# Patient Record
Sex: MALE | Race: WHITE | NOT HISPANIC OR LATINO | Employment: OTHER | URBAN - METROPOLITAN AREA
[De-identification: names, ages, dates, MRNs, and addresses within clinical notes are randomized per-mention and may not be internally consistent; named-entity substitution may affect disease eponyms.]

---

## 2018-12-13 ENCOUNTER — APPOINTMENT (OUTPATIENT)
Dept: RADIOLOGY | Facility: MEDICAL CENTER | Age: 78
End: 2018-12-13
Attending: EMERGENCY MEDICINE
Payer: MEDICARE

## 2018-12-13 ENCOUNTER — HOSPITAL ENCOUNTER (EMERGENCY)
Facility: MEDICAL CENTER | Age: 78
End: 2018-12-13
Attending: EMERGENCY MEDICINE
Payer: MEDICARE

## 2018-12-13 VITALS
TEMPERATURE: 97.6 F | HEIGHT: 67 IN | SYSTOLIC BLOOD PRESSURE: 139 MMHG | DIASTOLIC BLOOD PRESSURE: 72 MMHG | WEIGHT: 150 LBS | RESPIRATION RATE: 16 BRPM | HEART RATE: 58 BPM | BODY MASS INDEX: 23.54 KG/M2 | OXYGEN SATURATION: 98 %

## 2018-12-13 DIAGNOSIS — R53.1 WEAKNESS: ICD-10-CM

## 2018-12-13 DIAGNOSIS — N39.0 URINARY TRACT INFECTION WITHOUT HEMATURIA, SITE UNSPECIFIED: ICD-10-CM

## 2018-12-13 LAB
ALBUMIN SERPL BCP-MCNC: 4 G/DL (ref 3.2–4.9)
ALBUMIN/GLOB SERPL: 1.5 G/DL
ALP SERPL-CCNC: 80 U/L (ref 30–99)
ALT SERPL-CCNC: 17 U/L (ref 2–50)
ANION GAP SERPL CALC-SCNC: 5 MMOL/L (ref 0–11.9)
APPEARANCE UR: ABNORMAL
AST SERPL-CCNC: 15 U/L (ref 12–45)
BACTERIA #/AREA URNS HPF: ABNORMAL /HPF
BASOPHILS # BLD AUTO: 0.3 % (ref 0–1.8)
BASOPHILS # BLD: 0.03 K/UL (ref 0–0.12)
BILIRUB SERPL-MCNC: 0.6 MG/DL (ref 0.1–1.5)
BILIRUB UR QL STRIP.AUTO: NEGATIVE
BUN SERPL-MCNC: 23 MG/DL (ref 8–22)
CALCIUM SERPL-MCNC: 9.1 MG/DL (ref 8.5–10.5)
CHLORIDE SERPL-SCNC: 105 MMOL/L (ref 96–112)
CO2 SERPL-SCNC: 27 MMOL/L (ref 20–33)
COLOR UR: YELLOW
CREAT SERPL-MCNC: 1.01 MG/DL (ref 0.5–1.4)
EKG IMPRESSION: NORMAL
EOSINOPHIL # BLD AUTO: 0.04 K/UL (ref 0–0.51)
EOSINOPHIL NFR BLD: 0.4 % (ref 0–6.9)
EPI CELLS #/AREA URNS HPF: NEGATIVE /HPF
ERYTHROCYTE [DISTWIDTH] IN BLOOD BY AUTOMATED COUNT: 45.7 FL (ref 35.9–50)
GLOBULIN SER CALC-MCNC: 2.7 G/DL (ref 1.9–3.5)
GLUCOSE SERPL-MCNC: 95 MG/DL (ref 65–99)
GLUCOSE UR STRIP.AUTO-MCNC: NEGATIVE MG/DL
HCT VFR BLD AUTO: 52.8 % (ref 42–52)
HGB BLD-MCNC: 18.1 G/DL (ref 14–18)
HYALINE CASTS #/AREA URNS LPF: ABNORMAL /LPF
IMM GRANULOCYTES # BLD AUTO: 0.05 K/UL (ref 0–0.11)
IMM GRANULOCYTES NFR BLD AUTO: 0.5 % (ref 0–0.9)
KETONES UR STRIP.AUTO-MCNC: NEGATIVE MG/DL
LEUKOCYTE ESTERASE UR QL STRIP.AUTO: ABNORMAL
LYMPHOCYTES # BLD AUTO: 0.58 K/UL (ref 1–4.8)
LYMPHOCYTES NFR BLD: 5.3 % (ref 22–41)
MCH RBC QN AUTO: 32.7 PG (ref 27–33)
MCHC RBC AUTO-ENTMCNC: 34.3 G/DL (ref 33.7–35.3)
MCV RBC AUTO: 95.5 FL (ref 81.4–97.8)
MICRO URNS: ABNORMAL
MONOCYTES # BLD AUTO: 0.53 K/UL (ref 0–0.85)
MONOCYTES NFR BLD AUTO: 4.8 % (ref 0–13.4)
NEUTROPHILS # BLD AUTO: 9.77 K/UL (ref 1.82–7.42)
NEUTROPHILS NFR BLD: 88.7 % (ref 44–72)
NITRITE UR QL STRIP.AUTO: NEGATIVE
NRBC # BLD AUTO: 0 K/UL
NRBC BLD-RTO: 0 /100 WBC
PH UR STRIP.AUTO: 5 [PH]
PLATELET # BLD AUTO: 172 K/UL (ref 164–446)
PMV BLD AUTO: 10.3 FL (ref 9–12.9)
POTASSIUM SERPL-SCNC: 4.1 MMOL/L (ref 3.6–5.5)
PROT SERPL-MCNC: 6.7 G/DL (ref 6–8.2)
PROT UR QL STRIP: NEGATIVE MG/DL
RBC # BLD AUTO: 5.53 M/UL (ref 4.7–6.1)
RBC UR QL AUTO: ABNORMAL
SODIUM SERPL-SCNC: 137 MMOL/L (ref 135–145)
SP GR UR STRIP.AUTO: 1.04
TROPONIN I SERPL-MCNC: <0.01 NG/ML (ref 0–0.04)
UROBILINOGEN UR STRIP.AUTO-MCNC: 0.2 MG/DL
WBC # BLD AUTO: 11 K/UL (ref 4.8–10.8)
WBC #/AREA URNS HPF: ABNORMAL /HPF

## 2018-12-13 PROCEDURE — 81001 URINALYSIS AUTO W/SCOPE: CPT

## 2018-12-13 PROCEDURE — 70498 CT ANGIOGRAPHY NECK: CPT

## 2018-12-13 PROCEDURE — 85025 COMPLETE CBC W/AUTO DIFF WBC: CPT

## 2018-12-13 PROCEDURE — 80053 COMPREHEN METABOLIC PANEL: CPT

## 2018-12-13 PROCEDURE — 700111 HCHG RX REV CODE 636 W/ 250 OVERRIDE (IP): Performed by: EMERGENCY MEDICINE

## 2018-12-13 PROCEDURE — 700105 HCHG RX REV CODE 258: Performed by: EMERGENCY MEDICINE

## 2018-12-13 PROCEDURE — 93005 ELECTROCARDIOGRAM TRACING: CPT | Performed by: EMERGENCY MEDICINE

## 2018-12-13 PROCEDURE — 700117 HCHG RX CONTRAST REV CODE 255: Performed by: EMERGENCY MEDICINE

## 2018-12-13 PROCEDURE — 93005 ELECTROCARDIOGRAM TRACING: CPT

## 2018-12-13 PROCEDURE — 84484 ASSAY OF TROPONIN QUANT: CPT

## 2018-12-13 PROCEDURE — 70496 CT ANGIOGRAPHY HEAD: CPT

## 2018-12-13 PROCEDURE — 96365 THER/PROPH/DIAG IV INF INIT: CPT

## 2018-12-13 PROCEDURE — 99284 EMERGENCY DEPT VISIT MOD MDM: CPT

## 2018-12-13 RX ORDER — CEFDINIR 300 MG/1
300 CAPSULE ORAL 2 TIMES DAILY
Qty: 20 CAP | Refills: 0 | Status: SHIPPED | OUTPATIENT
Start: 2018-12-13 | End: 2019-04-06

## 2018-12-13 RX ADMIN — IOHEXOL 100 ML: 350 INJECTION, SOLUTION INTRAVENOUS at 15:23

## 2018-12-13 RX ADMIN — CEFTRIAXONE SODIUM 1 G: 1 INJECTION, POWDER, FOR SOLUTION INTRAMUSCULAR; INTRAVENOUS at 17:12

## 2018-12-13 ASSESSMENT — PAIN SCALES - GENERAL
PAINLEVEL_OUTOF10: 1
PAINLEVEL_OUTOF10: 0

## 2018-12-13 ASSESSMENT — LIFESTYLE VARIABLES: DO YOU DRINK ALCOHOL: NO

## 2018-12-13 NOTE — ED NOTES
General feeling of slight weakness and occasional cough noted. Nausea noted, no vomiting, or diarrhea noted.

## 2018-12-13 NOTE — ED TRIAGE NOTES
"Patient has dementia, per his daughter he is weak this am, no noted increased confusion.  He has been eating and drinking.  NO unilateral weakness noted, no major history otherwise.  Patient complaints of dizziness.  NO noted CP.  No SOB.  Daughter became upset when asked if the patient has been drinking water.  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    "

## 2018-12-13 NOTE — ED PROVIDER NOTES
"ED Provider Note    Scribed for Radhika Mei M.D. by Singh Moore. 2018, 12:52 PM.    Primary care provider: PCP, none noted.  Means of arrival: Walk in  History obtained from: Patient  History limited by: None    CHIEF COMPLAINT  Chief Complaint   Patient presents with   • Weakness   • Nausea   • Dizziness       HPI  Andre Troncoso is a 78 y.o. male with a history of glaucoma and cataracts who presents to the Emergency Department constant left sided weakness onset prior to arrival. The patient's daughter reports that he was able to move all of his extremities. He woke up dizzy, nauseated, and developed vision loss, but his symptoms have since completely resolved, only lasting for approximately 45 minutes. The patient's daughter reports increased PO intake. He denies any loss of appetite, emesis, diarrhea, constipation, dysuria, and malodorous urine. No exacerbating or alleviating factors noted.    REVIEW OF SYSTEMS  Pertinent positives include weakness, dizziness, nausea, and vision loss. Pertinent negatives include no loss of appetite, emesis, diarrhea, constipation, dysuria, and malodorous urine.  All other systems reviewed and negative.    PAST MEDICAL HISTORY   has a past medical history of Psychiatric disorder.    SURGICAL HISTORY  patient denies any surgical history    SOCIAL HISTORY  Social History   Substance Use Topics   • Smoking status: Current Every Day Smoker     Packs/day: 1.50     Years: 60.00     Types: Cigarettes   • Smokeless tobacco: Never Used   • Alcohol use No      History   Drug Use No       FAMILY HISTORY  Father is  secondary to stroke.    CURRENT MEDICATIONS  Reviewed.  See Encounter Summary.     ALLERGIES  No Known Allergies    PHYSICAL EXAM  VITAL SIGNS: /77   Pulse (!) 55   Temp 36.4 °C (97.6 °F) (Temporal)   Resp 17   Ht 1.702 m (5' 7\")   Wt 68 kg (150 lb)   SpO2 98%   BMI 23.49 kg/m²   Constitutional: Alert in no apparent distress.  HENT: No " signs of trauma, Bilateral external ears normal, Nose normal.   Eyes: Pupils are equal and reactive, Conjunctiva normal, Non-icteric. Poor vision  Neck: Normal range of motion, No tenderness, Supple, No stridor.   Cardiovascular: Regular rate and rhythm, no murmurs.   Thorax & Lungs: Normal breath sounds, No respiratory distress, No wheezing, No chest tenderness.   Abdomen: Bowel sounds normal, Soft, No tenderness, No masses, No peritoneal signs.  Skin: Warm, Dry, No erythema, No rash.   Musculoskeletal:  No major deformities noted.  Neurologic: Alert, Normal motor function. Poor vision and his finger to nose is not reliable he did not have any dysmetria just seemed to not know where my finger or his nose were, but his daughter states that this is baseline.    DIAGNOSTIC STUDIES / PROCEDURES     LABS  Labs Reviewed   CBC WITH DIFFERENTIAL - Abnormal; Notable for the following:        Result Value    WBC 11.0 (*)     Hemoglobin 18.1 (*)     Hematocrit 52.8 (*)     Neutrophils-Polys 88.70 (*)     Lymphocytes 5.30 (*)     Neutrophils (Absolute) 9.77 (*)     Lymphs (Absolute) 0.58 (*)     All other components within normal limits   COMP METABOLIC PANEL - Abnormal; Notable for the following:     Bun 23 (*)     All other components within normal limits   URINALYSIS,CULTURE IF INDICATED - Abnormal; Notable for the following:     Character Cloudy (*)     Leukocyte Esterase Moderate (*)     Occult Blood Small (*)     All other components within normal limits   URINE MICROSCOPIC (W/UA) - Abnormal; Notable for the following:     WBC Packed (*)     Bacteria Moderate (*)     All other components within normal limits   TROPONIN   ESTIMATED GFR   URINE CULTURE(NEW)     All labs were reviewed by me.    EKG  12 Lead EKG interpreted by me to show:  Sinus bradycardia  Rate 57  Axis: Normal  Intervals: Normal  No acute ST wave changes.  T wave inversion in aVL, but no other ischemia.  No old EKG for comparison.     RADIOLOGY  CT-CTA  "NECK WITH & W/O-POST PROCESSING   Final Result      1.  Atherosclerotic disease as described.      2.  Occlusion of the right vertebral artery with reconstitution distally. Vessel caliber remains extremely small.      CT-CTA HEAD WITH & W/O-POST PROCESS   Final Result      1.  No evidence of intracranial vascular occlusion or aneurysm.      2.  Very diminutive right vertebral artery. The left vertebral artery is patent.        The radiologist's interpretation of all radiological studies and images have been reviewed by me.    COURSE & MEDICAL DECISION MAKING  Pertinent Labs & Imaging studies reviewed. (See chart for details)    Differential diagnoses include but are not limited to: TIA, electrolyte abnormality, and urine infection    12:52 PM - Patient seen and examined at bedside. Ordered CT-CTA neck, CT-CTA neck, UA, CBC, CMP, Troponin, and EKG to evaluate his symptoms. I informed the patient's daughter and the patient that I am concerned for TIA and electrolyte abnormality. A CT scan can be performed to evaluate and he would like to undergo the imaging.    1:36 PM Ordered estimated GFR for evaluation.    4:55 PM I reevaluated the patient and found him resting in bed. I informed the patient and his daughter that he has a UTI. He will need to follow up with his PCP for evaluation. He will receive intravenous antibiotic here and then will be discharged with a prescription for antibiotics. Should he develop any new or worsening symptoms, he is to return for evaluation. The patient understands and agrees to discharge home.  4:58 PM Patient treated with Rocephin 1 g  mL IVPB.    /72   Pulse (!) 58   Temp 36.4 °C (97.6 °F) (Temporal)   Resp 16   Ht 1.702 m (5' 7\")   Wt 68 kg (150 lb)   SpO2 98%   BMI 23.49 kg/m²     Decision Making:  This is a 78 y.o. year old male who presents with generalized weakness.  There was report of him complaining his left side was weaker however this is not able to be " elicited on exam.  CT scans are negative for occlusion.  Labs do show evidence of urinary tract infection and he has a mildly elevated white blood cell count I do think this is likely the cause of generalized weakness.  He will be treated for this and will be discharged home he is instructed to follow-up with his primary care doctor as well.  Patient and his daughter are agreeable to this plan    The patient will return for new or worsening symptoms and is stable at the time of discharge.    Follow up with your primary care provider for reevaluation of your blood pressure.    DISPOSITION:  Patient will be discharged home in stable condition.    FOLLOW UP:  [facility], Emergency Dept  [address] [address] [phone]    Return for worsening weakness, headache, fever, nausea and vomiting or other concerns      OUTPATIENT MEDICATIONS:  New Prescriptions    CEFDINIR (OMNICEF) 300 MG CAP    Take 1 Cap by mouth 2 times a day.         FINAL IMPRESSION  1. Weakness    2. Urinary tract infection without hematuria, site unspecified                 This dictation has been created using voice recognition software and/or scribes. The accuracy of the dictation is limited by the abilities of the software and the expertise of the scribes. I expect there may be some errors of grammar and possibly content. I made every attempt to manually correct the errors within my dictation. However, errors related to voice recognition software and/or scribes may still exist and should be interpreted within the appropriate context.       Singh VANCE (Scribe), am scribing for, and in the presence of, Radhika Mei M.D..    Electronically signed by: Singh Moore (Scribe), 12/13/2018    Radhika VANCE M.D. personally performed the services described in this documentation, as scribed by Singh Moore in my presence, and it is both accurate and complete. C    The note accurately  reflects work and decisions made by me.  Radhika Mei  12/13/2018  6:54 PM

## 2018-12-14 NOTE — ED NOTES
Patient given discharge instructions and prescription. Discharge to home with daughter.  Patient ambulatory, no signs of distress noted.

## 2019-04-06 ENCOUNTER — APPOINTMENT (OUTPATIENT)
Dept: RADIOLOGY | Facility: MEDICAL CENTER | Age: 79
End: 2019-04-06
Attending: EMERGENCY MEDICINE
Payer: MEDICARE

## 2019-04-06 ENCOUNTER — HOSPITAL ENCOUNTER (OUTPATIENT)
Facility: MEDICAL CENTER | Age: 79
End: 2019-04-09
Attending: EMERGENCY MEDICINE | Admitting: HOSPITALIST
Payer: MEDICARE

## 2019-04-06 DIAGNOSIS — R55 SYNCOPE, UNSPECIFIED SYNCOPE TYPE: ICD-10-CM
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
## 2019-04-06 PROBLEM — R00.1 BRADYCARDIA: Status: ACTIVE | Noted: 2019-04-06

## 2019-04-06 LAB
ALBUMIN SERPL BCP-MCNC: 3.1 G/DL (ref 3.2–4.9)
ALBUMIN/GLOB SERPL: 1.2 G/DL
ALP SERPL-CCNC: 66 U/L (ref 30–99)
ALT SERPL-CCNC: 16 U/L (ref 2–50)
ANION GAP SERPL CALC-SCNC: 4 MMOL/L (ref 0–11.9)
AST SERPL-CCNC: 15 U/L (ref 12–45)
BASOPHILS # BLD AUTO: 0.8 % (ref 0–1.8)
BASOPHILS # BLD: 0.04 K/UL (ref 0–0.12)
BILIRUB SERPL-MCNC: 0.5 MG/DL (ref 0.1–1.5)
BUN SERPL-MCNC: 15 MG/DL (ref 8–22)
CALCIUM SERPL-MCNC: 8.1 MG/DL (ref 8.5–10.5)
CHLORIDE SERPL-SCNC: 109 MMOL/L (ref 96–112)
CO2 SERPL-SCNC: 27 MMOL/L (ref 20–33)
CREAT SERPL-MCNC: 1.18 MG/DL (ref 0.5–1.4)
EKG IMPRESSION: NORMAL
EOSINOPHIL # BLD AUTO: 0.11 K/UL (ref 0–0.51)
EOSINOPHIL NFR BLD: 2.1 % (ref 0–6.9)
ERYTHROCYTE [DISTWIDTH] IN BLOOD BY AUTOMATED COUNT: 47.2 FL (ref 35.9–50)
GLOBULIN SER CALC-MCNC: 2.6 G/DL (ref 1.9–3.5)
GLUCOSE SERPL-MCNC: 74 MG/DL (ref 65–99)
HCT VFR BLD AUTO: 49.9 % (ref 42–52)
HGB BLD-MCNC: 16.3 G/DL (ref 14–18)
IMM GRANULOCYTES # BLD AUTO: 0.03 K/UL (ref 0–0.11)
IMM GRANULOCYTES NFR BLD AUTO: 0.6 % (ref 0–0.9)
LYMPHOCYTES # BLD AUTO: 0.73 K/UL (ref 1–4.8)
LYMPHOCYTES NFR BLD: 14 % (ref 22–41)
MCH RBC QN AUTO: 32.5 PG (ref 27–33)
MCHC RBC AUTO-ENTMCNC: 32.7 G/DL (ref 33.7–35.3)
MCV RBC AUTO: 99.4 FL (ref 81.4–97.8)
MONOCYTES # BLD AUTO: 0.46 K/UL (ref 0–0.85)
MONOCYTES NFR BLD AUTO: 8.8 % (ref 0–13.4)
NEUTROPHILS # BLD AUTO: 3.84 K/UL (ref 1.82–7.42)
NEUTROPHILS NFR BLD: 73.7 % (ref 44–72)
NRBC # BLD AUTO: 0 K/UL
NRBC BLD-RTO: 0 /100 WBC
PLATELET # BLD AUTO: 155 K/UL (ref 164–446)
PMV BLD AUTO: 9.6 FL (ref 9–12.9)
POTASSIUM SERPL-SCNC: 4.1 MMOL/L (ref 3.6–5.5)
PROT SERPL-MCNC: 5.7 G/DL (ref 6–8.2)
RBC # BLD AUTO: 5.02 M/UL (ref 4.7–6.1)
SODIUM SERPL-SCNC: 140 MMOL/L (ref 135–145)
TROPONIN I SERPL-MCNC: <0.01 NG/ML (ref 0–0.04)
WBC # BLD AUTO: 5.2 K/UL (ref 4.8–10.8)

## 2019-04-06 PROCEDURE — G0378 HOSPITAL OBSERVATION PER HR: HCPCS

## 2019-04-06 PROCEDURE — 85025 COMPLETE CBC W/AUTO DIFF WBC: CPT

## 2019-04-06 PROCEDURE — 93005 ELECTROCARDIOGRAM TRACING: CPT | Performed by: EMERGENCY MEDICINE

## 2019-04-06 PROCEDURE — 36415 COLL VENOUS BLD VENIPUNCTURE: CPT

## 2019-04-06 PROCEDURE — 71045 X-RAY EXAM CHEST 1 VIEW: CPT

## 2019-04-06 PROCEDURE — 99219 PR INITIAL OBSERVATION CARE,LEVL II: CPT | Performed by: HOSPITALIST

## 2019-04-06 PROCEDURE — 93005 ELECTROCARDIOGRAM TRACING: CPT

## 2019-04-06 PROCEDURE — 99285 EMERGENCY DEPT VISIT HI MDM: CPT

## 2019-04-06 PROCEDURE — 80053 COMPREHEN METABOLIC PANEL: CPT

## 2019-04-06 PROCEDURE — 84484 ASSAY OF TROPONIN QUANT: CPT

## 2019-04-06 PROCEDURE — 70450 CT HEAD/BRAIN W/O DYE: CPT

## 2019-04-06 PROCEDURE — 700111 HCHG RX REV CODE 636 W/ 250 OVERRIDE (IP): Performed by: HOSPITALIST

## 2019-04-06 RX ORDER — ACETAMINOPHEN 500 MG
500 TABLET ORAL EVERY 4 HOURS PRN
COMMUNITY

## 2019-04-06 RX ORDER — BISACODYL 10 MG
10 SUPPOSITORY, RECTAL RECTAL
Status: DISCONTINUED | OUTPATIENT
Start: 2019-04-06 | End: 2019-04-09 | Stop reason: HOSPADM

## 2019-04-06 RX ORDER — ACETAMINOPHEN 325 MG/1
650 TABLET ORAL EVERY 6 HOURS PRN
Status: DISCONTINUED | OUTPATIENT
Start: 2019-04-06 | End: 2019-04-09 | Stop reason: HOSPADM

## 2019-04-06 RX ORDER — POLYETHYLENE GLYCOL 3350 17 G/17G
1 POWDER, FOR SOLUTION ORAL
Status: DISCONTINUED | OUTPATIENT
Start: 2019-04-06 | End: 2019-04-09 | Stop reason: HOSPADM

## 2019-04-06 RX ORDER — AMOXICILLIN 250 MG
2 CAPSULE ORAL 2 TIMES DAILY
Status: DISCONTINUED | OUTPATIENT
Start: 2019-04-06 | End: 2019-04-09 | Stop reason: HOSPADM

## 2019-04-06 RX ORDER — PERMETHRIN 50 MG/G
30 CREAM TOPICAL
Status: ON HOLD | COMMUNITY
End: 2019-04-09

## 2019-04-06 RX ADMIN — ENOXAPARIN SODIUM 40 MG: 100 INJECTION SUBCUTANEOUS at 19:00

## 2019-04-06 ASSESSMENT — COGNITIVE AND FUNCTIONAL STATUS - GENERAL
SUGGESTED CMS G CODE MODIFIER DAILY ACTIVITY: CK
DRESSING REGULAR UPPER BODY CLOTHING: A LITTLE
PERSONAL GROOMING: A LITTLE
WALKING IN HOSPITAL ROOM: A LITTLE
DRESSING REGULAR LOWER BODY CLOTHING: A LITTLE
TOILETING: A LITTLE
SUGGESTED CMS G CODE MODIFIER MOBILITY: CJ
TURNING FROM BACK TO SIDE WHILE IN FLAT BAD: A LITTLE
DAILY ACTIVITIY SCORE: 19
HELP NEEDED FOR BATHING: A LITTLE
CLIMB 3 TO 5 STEPS WITH RAILING: A LITTLE
MOBILITY SCORE: 21

## 2019-04-06 ASSESSMENT — COPD QUESTIONNAIRES
IN THE PAST 12 MONTHS DO YOU DO LESS THAN YOU USED TO BECAUSE OF YOUR BREATHING PROBLEMS: DISAGREE/UNSURE
DURING THE PAST 4 WEEKS HOW MUCH DID YOU FEEL SHORT OF BREATH: NONE/LITTLE OF THE TIME
HAVE YOU SMOKED AT LEAST 100 CIGARETTES IN YOUR ENTIRE LIFE: NO/DON'T KNOW
COPD SCREENING SCORE: 2
DO YOU EVER COUGH UP ANY MUCUS OR PHLEGM?: NO/ONLY WITH OCCASIONAL COLDS OR INFECTIONS

## 2019-04-06 ASSESSMENT — ENCOUNTER SYMPTOMS
HEMOPTYSIS: 0
NAUSEA: 0
PALPITATIONS: 0
SPUTUM PRODUCTION: 0
COUGH: 0
VOMITING: 0
DIZZINESS: 0
ORTHOPNEA: 0
CHILLS: 0

## 2019-04-06 ASSESSMENT — LIFESTYLE VARIABLES: EVER_SMOKED: YES

## 2019-04-06 NOTE — ED NOTES
Med rec complete per pt at bedside   Pt denies taking medications   NKDA  No oral ABX within last 30 days

## 2019-04-06 NOTE — ED PROVIDER NOTES
ED Provider Note    CHIEF COMPLAINT  Chief Complaint   Patient presents with   • T-5000 GLF   • Syncope       HPI  Andre Troncoso is a 78 y.o. male who presents after being found on the ground next to his chair at the [facility] his place of residence.  Staff there was uncertain what occurred, and said that he was found on the ground next to his chair.  The patient had no complaints.  He has a history of dementia, and per staff has been there about 1 week.  Patient was transferred by EMS to the ER for further evaluation.  On arrival, he appears oriented to person.  He cannot tell me or remember what happened.  He denies any pain or injuries.  He denies any headache, neck pain, chest pain, back pain, or abdominal pain.  He can move his arms legs without difficulty, denies any numbness or tingling or weakness.  He has had no recent nausea, vomiting, or diarrhea.  He says he is hungry.    REVIEW OF SYSTEMS  See HPI for further details. All other systems are negative.     PAST MEDICAL HISTORY  Past Medical History:   Diagnosis Date   • Psychiatric disorder     dementai        FAMILY HISTORY  No family history on file.    SOCIAL HISTORY  Social History     Social History   • Marital status: Single     Spouse name: N/A   • Number of children: N/A   • Years of education: N/A     Social History Main Topics   • Smoking status: Current Every Day Smoker     Packs/day: 1.50     Years: 60.00     Types: Cigarettes   • Smokeless tobacco: Never Used   • Alcohol use No   • Drug use: No   • Sexual activity: Not on file     Other Topics Concern   • Not on file     Social History Narrative   • No narrative on file       SURGICAL HISTORY  History reviewed. No pertinent surgical history.    CURRENT MEDICATIONS  Home Medications     Reviewed by Adore Umanzor R.N. (Registered Nurse) on 04/06/19 at 0937  Med List Status: Partial   Medication Last Dose Status   cefdinir (OMNICEF) 300 MG Cap  Active                ALLERGIES  No  Known Allergies    PHYSICAL EXAM  VITAL SIGNS: /68   Pulse (!) 50   Temp 36.6 °C (97.9 °F) (Temporal)   Resp 17   Wt 68 kg (149 lb 14.6 oz)   SpO2 97%   BMI 23.48 kg/m²   Constitutional: Awake, alert, in no acute distress, Non-toxic appearance.   HENT: Atraumatic.  No cephalhematomas or any tenderness.   Bilateral external ears normal, mucous membranes moist, throat nonerythematous without exudates, nose is normal.  No facial tenderness.  Eyes: PERRL, EOMI, conjunctiva moist, noninjected.  Neck: Nontender, Normal range of motion, No nuchal rigidity, No stridor.   Lymphatic: No lymphadenopathy noted.   Cardiovascular: Regular rhythm, bradycardic, rate in the 40s-50s, no murmurs, rubs, gallops.  Thorax & Lungs:  Good breath sounds bilaterally, no wheezes, rales, or retractions.  No chest tenderness.  Abdomen: Bowel sounds normal, Soft, nontender, nondistended, no rebound, guarding, masses.  Back: No CVA or spinal tenderness.  Extremities: Intact distal pulses, No edema, No tenderness.   Skin: Warm, Dry, No rashes.   Musculoskeletal: No joint swelling or tenderness.  Neurologic: Alert & oriented x 1, cranial nerves II through XII appear intact, sensory and motor function normal. No focal deficits.   Psychiatric: Affect normal, mood normal, there is very to have short-term memory for since.    EKG  Twelve-lead EKG shows a sinus bradycardia, rate of 43, normal intervals, normal axis, no acute ST wave elevations or ST depressions, no pathologic Q waves, no evidence of an acute injury or ischemic pattern on my reading, in comparison to previous EKG from December, 2018, the patient was in a sinus bradycardic rhythm previously with a rate of 57.    RADIOLOGY/PROCEDURES  CT-HEAD W/O   Final Result      1.  No evidence of acute intracranial process.      2.  Cerebral atrophy as well as periventricular chronic small vessel ischemic change.      DX-CHEST-PORTABLE (1 VIEW)   Final Result      No evidence of acute  cardiopulmonary process.          COURSE & MEDICAL DECISION MAKING  Pertinent Labs & Imaging studies reviewed. (See chart for details)  The patient presents after a possible syncopal episode.  He is awake, and alert.  He denies any pain or injuries.  EKG shows sinus bradycardia, rate of 43.  Chest x-ray shows no acute cardiac or pulmonary abnormalities.  CT scan of head without contrast shows no acute intracranial process.  CBC shows white count 5200, hemoglobin 16.3, 74% polys, chemistry shows a calcium of 8.1, otherwise unremarkable.  Troponin less than 0.01.  On reexamination, the patient remained without complaints.  He did eat a lunch meal.  However given his bradycardic rhythm, patient will be admitted for observation.  I discussed case with Dr. Bustamante.    FINAL IMPRESSION  1.  Syncope   2.  Bradycardia  3.         Electronically signed by: Killian Ambrocio, 4/6/2019 11:59 AM

## 2019-04-06 NOTE — CARE PLAN
Problem: Safety  Goal: Will remain free from injury  Outcome: PROGRESSING AS EXPECTED  Patient was admitted after being found on the ground at his assisted living facility. Patient has a history of dementia and has been trying to get out of bed to leave. Patient has been placed on a vest restraint. Patient is alert and oriented to self only. Poor historian. Call light within reach. Bed alarm in place. Will continue to monitor.   Goal: Will remain free from falls  Outcome: PROGRESSING AS EXPECTED  Patient is a fall risk. Will round hourly and offer use of toilet to assist patient. Call light within reach. Continue to reorient patient as appropriate. Bed in lowest position, bed alarm engaged, vest restraint in place.

## 2019-04-06 NOTE — ED NOTES
Pt back from CT. Placed in B18, in view of sitter as pt is confused and has had multiple attempts to get OOB. Pt is unsafe to ambulate.

## 2019-04-06 NOTE — ED TRIAGE NOTES
Chief Complaint   Patient presents with   • T-5000 GLF   • Syncope     BIB EMS from [facility]. Per EMS pt had possible fall from chair. Unwitnessed. Pt denies pain/injury. Pt at baseline oriented to self. Pt placed on monitor. Chart up for ERP.

## 2019-04-07 ENCOUNTER — APPOINTMENT (OUTPATIENT)
Dept: CARDIOLOGY | Facility: MEDICAL CENTER | Age: 79
End: 2019-04-07
Attending: FAMILY MEDICINE
Payer: MEDICARE

## 2019-04-07 PROBLEM — H53.8 VISION BLURRED: Status: ACTIVE | Noted: 2019-04-07

## 2019-04-07 PROBLEM — E53.8 VITAMIN B12 DEFICIENCY: Status: ACTIVE | Noted: 2019-04-07

## 2019-04-07 PROBLEM — E55.9 VITAMIN D DEFICIENCY: Status: ACTIVE | Noted: 2019-04-07

## 2019-04-07 PROBLEM — E83.42 HYPOMAGNESEMIA: Status: ACTIVE | Noted: 2019-04-07

## 2019-04-07 PROBLEM — F02.80 DEMENTIA ASSOCIATED WITH OTHER UNDERLYING DISEASE WITHOUT BEHAVIORAL DISTURBANCE (HCC): Status: ACTIVE | Noted: 2019-04-07

## 2019-04-07 PROBLEM — H54.3 BLIND IN BOTH EYES: Status: ACTIVE | Noted: 2019-04-07

## 2019-04-07 LAB
25(OH)D3 SERPL-MCNC: 21 NG/ML (ref 30–100)
ANION GAP SERPL CALC-SCNC: 5 MMOL/L (ref 0–11.9)
BASOPHILS # BLD AUTO: 0.6 % (ref 0–1.8)
BASOPHILS # BLD: 0.04 K/UL (ref 0–0.12)
BUN SERPL-MCNC: 20 MG/DL (ref 8–22)
CALCIUM SERPL-MCNC: 8.2 MG/DL (ref 8.5–10.5)
CHLORIDE SERPL-SCNC: 112 MMOL/L (ref 96–112)
CO2 SERPL-SCNC: 24 MMOL/L (ref 20–33)
CREAT SERPL-MCNC: 0.97 MG/DL (ref 0.5–1.4)
EOSINOPHIL # BLD AUTO: 0.13 K/UL (ref 0–0.51)
EOSINOPHIL NFR BLD: 2.1 % (ref 0–6.9)
ERYTHROCYTE [DISTWIDTH] IN BLOOD BY AUTOMATED COUNT: 46.3 FL (ref 35.9–50)
GLUCOSE SERPL-MCNC: 97 MG/DL (ref 65–99)
HCT VFR BLD AUTO: 46.3 % (ref 42–52)
HGB BLD-MCNC: 15.8 G/DL (ref 14–18)
IMM GRANULOCYTES # BLD AUTO: 0.02 K/UL (ref 0–0.11)
IMM GRANULOCYTES NFR BLD AUTO: 0.3 % (ref 0–0.9)
LV EJECT FRACT  99904: 65
LV EJECT FRACT MOD 2C 99903: 65.33
LV EJECT FRACT MOD 4C 99902: 75.35
LV EJECT FRACT MOD BP 99901: 70.78
LYMPHOCYTES # BLD AUTO: 1.11 K/UL (ref 1–4.8)
LYMPHOCYTES NFR BLD: 17.9 % (ref 22–41)
MAGNESIUM SERPL-MCNC: 1.9 MG/DL (ref 1.5–2.5)
MCH RBC QN AUTO: 33.1 PG (ref 27–33)
MCHC RBC AUTO-ENTMCNC: 34.1 G/DL (ref 33.7–35.3)
MCV RBC AUTO: 97.1 FL (ref 81.4–97.8)
MONOCYTES # BLD AUTO: 0.61 K/UL (ref 0–0.85)
MONOCYTES NFR BLD AUTO: 9.9 % (ref 0–13.4)
NEUTROPHILS # BLD AUTO: 4.28 K/UL (ref 1.82–7.42)
NEUTROPHILS NFR BLD: 69.2 % (ref 44–72)
NRBC # BLD AUTO: 0 K/UL
NRBC BLD-RTO: 0 /100 WBC
PHOSPHATE SERPL-MCNC: 2.8 MG/DL (ref 2.5–4.5)
PLATELET # BLD AUTO: 149 K/UL (ref 164–446)
PMV BLD AUTO: 9.7 FL (ref 9–12.9)
POTASSIUM SERPL-SCNC: 4.1 MMOL/L (ref 3.6–5.5)
RBC # BLD AUTO: 4.77 M/UL (ref 4.7–6.1)
SODIUM SERPL-SCNC: 141 MMOL/L (ref 135–145)
TSH SERPL DL<=0.005 MIU/L-ACNC: 1.8 UIU/ML (ref 0.38–5.33)
VIT B12 SERPL-MCNC: 256 PG/ML (ref 211–911)
WBC # BLD AUTO: 6.2 K/UL (ref 4.8–10.8)

## 2019-04-07 PROCEDURE — 84443 ASSAY THYROID STIM HORMONE: CPT

## 2019-04-07 PROCEDURE — 83735 ASSAY OF MAGNESIUM: CPT

## 2019-04-07 PROCEDURE — A9270 NON-COVERED ITEM OR SERVICE: HCPCS | Performed by: FAMILY MEDICINE

## 2019-04-07 PROCEDURE — 84100 ASSAY OF PHOSPHORUS: CPT

## 2019-04-07 PROCEDURE — 93306 TTE W/DOPPLER COMPLETE: CPT | Mod: 26 | Performed by: INTERNAL MEDICINE

## 2019-04-07 PROCEDURE — 51798 US URINE CAPACITY MEASURE: CPT

## 2019-04-07 PROCEDURE — 99226 PR SUBSEQUENT OBSERVATION CARE,LEVEL III: CPT | Performed by: FAMILY MEDICINE

## 2019-04-07 PROCEDURE — G0378 HOSPITAL OBSERVATION PER HR: HCPCS

## 2019-04-07 PROCEDURE — 700111 HCHG RX REV CODE 636 W/ 250 OVERRIDE (IP): Performed by: HOSPITALIST

## 2019-04-07 PROCEDURE — 700102 HCHG RX REV CODE 250 W/ 637 OVERRIDE(OP): Performed by: HOSPITALIST

## 2019-04-07 PROCEDURE — 36415 COLL VENOUS BLD VENIPUNCTURE: CPT

## 2019-04-07 PROCEDURE — 82607 VITAMIN B-12: CPT

## 2019-04-07 PROCEDURE — 700111 HCHG RX REV CODE 636 W/ 250 OVERRIDE (IP): Performed by: FAMILY MEDICINE

## 2019-04-07 PROCEDURE — A9270 NON-COVERED ITEM OR SERVICE: HCPCS | Performed by: HOSPITALIST

## 2019-04-07 PROCEDURE — 80048 BASIC METABOLIC PNL TOTAL CA: CPT

## 2019-04-07 PROCEDURE — 93306 TTE W/DOPPLER COMPLETE: CPT

## 2019-04-07 PROCEDURE — 85025 COMPLETE CBC W/AUTO DIFF WBC: CPT

## 2019-04-07 PROCEDURE — 700102 HCHG RX REV CODE 250 W/ 637 OVERRIDE(OP): Performed by: FAMILY MEDICINE

## 2019-04-07 PROCEDURE — 82306 VITAMIN D 25 HYDROXY: CPT

## 2019-04-07 RX ORDER — TAMSULOSIN HYDROCHLORIDE 0.4 MG/1
0.4 CAPSULE ORAL
Status: DISCONTINUED | OUTPATIENT
Start: 2019-04-07 | End: 2019-04-09 | Stop reason: HOSPADM

## 2019-04-07 RX ORDER — CHOLECALCIFEROL (VITAMIN D3) 125 MCG
1000 CAPSULE ORAL DAILY
Status: DISCONTINUED | OUTPATIENT
Start: 2019-04-07 | End: 2019-04-09 | Stop reason: HOSPADM

## 2019-04-07 RX ORDER — CYANOCOBALAMIN 1000 UG/ML
1000 INJECTION, SOLUTION INTRAMUSCULAR; SUBCUTANEOUS ONCE
Status: COMPLETED | OUTPATIENT
Start: 2019-04-07 | End: 2019-04-07

## 2019-04-07 RX ORDER — MAGNESIUM SULFATE HEPTAHYDRATE 40 MG/ML
2 INJECTION, SOLUTION INTRAVENOUS ONCE
Status: COMPLETED | OUTPATIENT
Start: 2019-04-07 | End: 2019-04-07

## 2019-04-07 RX ADMIN — MAGNESIUM OXIDE TAB 400 MG (241.3 MG ELEMENTAL MG) 400 MG: 400 (241.3 MG) TAB at 17:17

## 2019-04-07 RX ADMIN — MAGNESIUM SULFATE IN WATER 2 G: 40 INJECTION, SOLUTION INTRAVENOUS at 11:20

## 2019-04-07 RX ADMIN — SENNOSIDES,DOCUSATE SODIUM 2 TABLET: 8.6; 5 TABLET, FILM COATED ORAL at 05:06

## 2019-04-07 RX ADMIN — CYANOCOBALAMIN 1000 MCG: 1000 INJECTION, SOLUTION INTRAMUSCULAR; SUBCUTANEOUS at 14:39

## 2019-04-07 RX ADMIN — ENOXAPARIN SODIUM 40 MG: 100 INJECTION SUBCUTANEOUS at 05:06

## 2019-04-07 RX ADMIN — MAGNESIUM OXIDE TAB 400 MG (241.3 MG ELEMENTAL MG) 400 MG: 400 (241.3 MG) TAB at 11:19

## 2019-04-07 RX ADMIN — VITAMIN D, TAB 1000IU (100/BT) 5000 UNITS: 25 TAB at 05:05

## 2019-04-07 RX ADMIN — SENNOSIDES,DOCUSATE SODIUM 2 TABLET: 8.6; 5 TABLET, FILM COATED ORAL at 17:17

## 2019-04-07 RX ADMIN — TAMSULOSIN HYDROCHLORIDE 0.4 MG: 0.4 CAPSULE ORAL at 17:17

## 2019-04-07 RX ADMIN — CYANOCOBALAMIN TAB 500 MCG 1000 MCG: 500 TAB at 14:40

## 2019-04-07 ASSESSMENT — ENCOUNTER SYMPTOMS
COUGH: 0
EYE DISCHARGE: 0
WEAKNESS: 0
BACK PAIN: 0
NERVOUS/ANXIOUS: 0
FEVER: 0
SORE THROAT: 0
PALPITATIONS: 0
WHEEZING: 0
NECK PAIN: 0
ABDOMINAL PAIN: 0
FOCAL WEAKNESS: 0
EYE PAIN: 0
HEADACHES: 0
DIARRHEA: 0
SPEECH CHANGE: 0
NAUSEA: 0
SENSORY CHANGE: 0
SHORTNESS OF BREATH: 0
BLURRED VISION: 1
EYE REDNESS: 0
HEARTBURN: 0
VOMITING: 0
CHILLS: 0
DIZZINESS: 0

## 2019-04-07 ASSESSMENT — PATIENT HEALTH QUESTIONNAIRE - PHQ9
2. FEELING DOWN, DEPRESSED, IRRITABLE, OR HOPELESS: NOT AT ALL
1. LITTLE INTEREST OR PLEASURE IN DOING THINGS: NOT AT ALL
SUM OF ALL RESPONSES TO PHQ9 QUESTIONS 1 AND 2: 0

## 2019-04-07 NOTE — PROGRESS NOTES
"Contacted [facility] regarding the visual impairment. Per facility employee, they are aware of his vision problem but they don't think he is blind and \"he probably sees because he can get around.\" They state he walks around the facility on his own. Updated them on MD assessment here at the hospital.   "

## 2019-04-07 NOTE — PROGRESS NOTES
Orthostatic BP complete:  Supine 111/63, HR 56  Sitting 124/69, HR 60  Standing 128/58, HR 63    Pt is dizzy and weak with standing.

## 2019-04-07 NOTE — PROGRESS NOTES
Received bedside report from PM nurse. Assumed patient care. Chart reviewed. Pt was resting in bed. A&O x 1, confused. Sustaining in SB 40-50's. Asymptomatic.   Lap belt in place as a restraint per active order.   Patient denies pain. POC updated with pt and on the patient communication board. Bed locked and in the lowest position. Call light within reach. Tele box on. Will continue to monitor.

## 2019-04-07 NOTE — PROGRESS NOTES
Bedside report received, pt care assumed. Pt is awaked, disoriented to time, place, and situation. Pt attempt to get oob without assistance/high fall risk- vest in place as per order for disruption in care, bed alarm x2 for safety, and pt room close to nursing station. Frequent reorientation provided, pt educated on call bell use- not to get oob on own and plan of care. Pt is calm, polite at this time. Declines any current needs, denies pain. Will continue to monitor.

## 2019-04-07 NOTE — PROGRESS NOTES
Rounds with Dr. Shahid. Pt is confused and seems to be unable to visualize objects. Assessment by MD. Pt is legally blind. Pt is unsure if he is and is not able to verbalize the difficulty seeing.

## 2019-04-07 NOTE — PROGRESS NOTES
Order changed from vest to lap belt for pt safety. Pt is confused and no evidence of learning. Pt return demonstrated how to discontinue lap belt appropriately, twice before application. Pt remains polite, confused.

## 2019-04-07 NOTE — PROGRESS NOTES
Withdrew 1400 mL urine via straight cath at 1620. 3 attempts made. 18 Peruvian coude cath successful.

## 2019-04-07 NOTE — PROGRESS NOTES
"[facility] Medicine Daily Progress Note    Date of Service  4/7/2019    Chief Complaint  78 y.o. male admitted 4/6/2019 with Fall.     Hospital Course  Admitted with fall, noted to have bradycardia in the 40s-50s.    Interval Problem Update  Fall - unknown details, poor historian  Bradycardia -40-50s, SBP 120s, denies dizziness or lightheadedness  Blurred vision -patient states is chronic, unable to do an accurate exam, he keeps saying \"I can see.\"  Low B12 and vitamin D level  Low magnesium    Consultants/Specialty  Consult Palliative care    Code Status  Full    Disposition  TBD    Review of Systems  Review of Systems   Constitutional: Negative for chills, fever and malaise/fatigue.   HENT: Negative for hearing loss and sore throat.    Eyes: Positive for blurred vision. Negative for pain, discharge and redness.   Respiratory: Negative for cough, shortness of breath and wheezing.    Cardiovascular: Negative for chest pain, palpitations and leg swelling.   Gastrointestinal: Negative for abdominal pain, diarrhea, heartburn, nausea and vomiting.   Genitourinary: Negative for dysuria.   Musculoskeletal: Negative for back pain and neck pain.   Skin: Negative for rash.   Neurological: Negative for dizziness, sensory change, speech change, focal weakness, weakness and headaches.   Psychiatric/Behavioral: The patient is not nervous/anxious.         Physical Exam  Temp:  [35.8 °C (96.4 °F)-36.6 °C (97.8 °F)] 36.6 °C (97.8 °F)  Pulse:  [51-93] 63  Resp:  [16-19] 16  BP: (104-183)/(53-93) 128/58  SpO2:  [76 %-99 %] 96 %    Physical Exam   Constitutional: He appears well-developed and well-nourished.   HENT:   Head: Normocephalic and atraumatic.   Eyes: Pupils are equal, round, and reactive to light. Conjunctivae are normal.   Unable to assess EOMs  Equivocal visual threat OU   Neck: No tracheal deviation present. No thyromegaly present.   Cardiovascular: Normal rate and regular rhythm.    Pulmonary/Chest: Effort normal and " breath sounds normal.   Abdominal: Soft. Bowel sounds are normal. He exhibits no distension. There is no tenderness.   Musculoskeletal: He exhibits no edema.   Lymphadenopathy:     He has no cervical adenopathy.   Neurological: He is alert. No cranial nerve deficit.   Oriented to self only  MMT 5/5   Skin: Skin is warm and dry.   Nursing note and vitals reviewed.      Fluids    Intake/Output Summary (Last 24 hours) at 04/07/19 1221  Last data filed at 04/07/19 1000   Gross per 24 hour   Intake              560 ml   Output              500 ml   Net               60 ml       Laboratory  Recent Labs      04/06/19   0941  04/07/19   0230   WBC  5.2  6.2   RBC  5.02  4.77   HEMOGLOBIN  16.3  15.8   HEMATOCRIT  49.9  46.3   MCV  99.4*  97.1   MCH  32.5  33.1*   MCHC  32.7*  34.1   RDW  47.2  46.3   PLATELETCT  155*  149*   MPV  9.6  9.7     Recent Labs      04/06/19   0941  04/07/19   0230   SODIUM  140  141   POTASSIUM  4.1  4.1   CHLORIDE  109  112   CO2  27  24   GLUCOSE  74  97   BUN  15  20   CREATININE  1.18  0.97   CALCIUM  8.1*  8.2*                   Imaging  CT-HEAD W/O   Final Result      1.  No evidence of acute intracranial process.      2.  Cerebral atrophy as well as periventricular chronic small vessel ischemic change.      DX-CHEST-PORTABLE (1 VIEW)   Final Result      No evidence of acute cardiopulmonary process.      EC-ECHOCARDIOGRAM COMPLETE W/O CONT    (Results Pending)        Assessment/Plan  * Fall- (present on admission)   Assessment & Plan    PT and OT evaluations     Hypomagnesemia- (present on admission)   Assessment & Plan    IV and oral magnesium  Follow level     Vision blurred- (present on admission)   Assessment & Plan    Unknown baseline     Dementia associated with other underlying disease without behavioral disturbance- (present on admission)   Assessment & Plan    Avoid benzodiazepines and anticholinergics  Frequent orientation  Avoid early morning labs  Avoid vital signs during  sleep  Ambulate if possible     Vitamin D deficiency- (present on admission)   Assessment & Plan    Start replacement     Vitamin B12 deficiency- (present on admission)   Assessment & Plan    Start IM and oral B12     Bradycardia- (present on admission)   Assessment & Plan    Unknown baseline  Check echocardiogram  Telemetry monitoring          VTE prophylaxis: Lovenox

## 2019-04-07 NOTE — ASSESSMENT & PLAN NOTE
Avoid benzodiazepines and anticholinergics  Frequent orientation  Avoid early morning labs  Avoid vital signs during sleep  Ambulate if possible

## 2019-04-07 NOTE — H&P
Hospital Medicine History & Physical Note    Date of Service  4/6/2019    Primary Care Physician  TINO Ballard.    Consultants  None    Code Status  Full Code    Chief Complaint  Found on the floor    History of Presenting Illness  78 y.o. male who presented 4/6/2019 after being found on the floor.    Mr Troncoso has a history of dementia, he resides at [facility].  Patient is very pleasant and cooperative, he does not recall what happened.  Due to his dementia he is unable to provide history of present illness.  Patient was found on the floor near his chair at [facility] and brought to the emergency room for evaluation, here in the ER patient had a CT scan of his head that was unremarkable.  His heart rate has dipped down to the 40's, he is assympotmatic    Review of Systems  Review of Systems   Constitutional: Negative for chills and malaise/fatigue.   Respiratory: Negative for cough, hemoptysis and sputum production.    Cardiovascular: Negative for chest pain, palpitations and orthopnea.   Gastrointestinal: Negative for nausea and vomiting.   Skin: Negative for itching and rash.   Neurological: Negative for dizziness.   All other systems reviewed and are negative.      Past Medical History   has a past medical history of Macular degeneration; Psychiatric disorder; and UTI (urinary tract infection). He also has no past medical history of Asthma; CAD (coronary artery disease); Cancer (HCC); Chronic obstructive pulmonary disease (HCC); Congestive heart failure (HCC); Diabetes (HCC); Hypertension; Renal disorder; Seizure disorder (HCC); or Stroke (HCC).    Surgical History   has a past surgical history that includes other.     Family History  Unknown    Social History   reports that he has been smoking Cigarettes.  He has a 90.00 pack-year smoking history. He has never used smokeless tobacco. He reports that he does not drink alcohol or use drugs.    Allergies  No Known Allergies    Medications  Prior to  Admission Medications   Prescriptions Last Dose Informant Patient Reported? Taking?   acetaminophen (TYLENOL) 500 MG Tab   Yes Yes   Sig: Take 500 mg by mouth every four hours as needed.   permethrin (ELIMITE) 5 % Cream 4/3/2019 at 1000  Yes Yes   Sig: Apply 30 g to affected area(s) every 7 days.   vitamin D (CHOLECALCIFEROL) 1000 UNIT Tab 4/6/2019 at Unknown time  Yes Yes   Sig: Take 5,000 Units by mouth every day.      Facility-Administered Medications: None       Physical Exam  Temp:  [36.1 °C (97 °F)-36.6 °C (97.9 °F)] 36.1 °C (97 °F)  Pulse:  [42-76] 51  Resp:  [14-19] 18  BP: (136-183)/(68-93) 183/93  SpO2:  [76 %-100 %] 99 %    Physical Exam   Constitutional: He is oriented to person, place, and time. He appears well-developed and well-nourished.   HENT:   Head: Normocephalic and atraumatic.   Eyes: Conjunctivae and EOM are normal. Right eye exhibits no discharge. Left eye exhibits no discharge.   Cardiovascular: Regular rhythm and intact distal pulses.    No murmur heard.  Bradycardic   Pulmonary/Chest: Effort normal and breath sounds normal. No respiratory distress. He has no wheezes.   Abdominal: Soft. Bowel sounds are normal. He exhibits no distension. There is no tenderness. There is no rebound.   Musculoskeletal: Normal range of motion. He exhibits no edema.   Neurological: He is alert and oriented to person, place, and time. No cranial nerve deficit.   Skin: Skin is warm and dry. He is not diaphoretic. No erythema.   Skin is warm and well perfused   Psychiatric: He has a normal mood and affect.       Laboratory:  Recent Labs      04/06/19   0941   WBC  5.2   RBC  5.02   HEMOGLOBIN  16.3   HEMATOCRIT  49.9   MCV  99.4*   MCH  32.5   MCHC  32.7*   RDW  47.2   PLATELETCT  155*   MPV  9.6     Recent Labs      04/06/19   0941   SODIUM  140   POTASSIUM  4.1   CHLORIDE  109   CO2  27   GLUCOSE  74   BUN  15   CREATININE  1.18   CALCIUM  8.1*     Recent Labs      04/06/19   0941   ALTSGPT  16   ASTSGOT  15    ALKPHOSPHAT  66   TBILIRUBIN  0.5   GLUCOSE  74                 Recent Labs      04/06/19   0941   TROPONINI  <0.01       Urinalysis:    No results found     Imaging:  CT-HEAD W/O   Final Result      1.  No evidence of acute intracranial process.      2.  Cerebral atrophy as well as periventricular chronic small vessel ischemic change.      DX-CHEST-PORTABLE (1 VIEW)   Final Result      No evidence of acute cardiopulmonary process.            Assessment/Plan:  I anticipate this patient is appropriate for observation status at this time.    Fall- (present on admission)   Assessment & Plan    Patient unable to provide details, he was found on the floor  Rule out syncope from bradycardia  PT evaluation     Bradycardia- (present on admission)   Assessment & Plan    Patient has bradycardia, his heart rate is dipping in the low 40s  Observe overnight, continuous hemodynamic monitoring         VTE prophylaxis: lovenox

## 2019-04-07 NOTE — PROGRESS NOTES
Received report from night shift nurse. Tele box on. Updated patient about plan of care. All questions answered. Pt complained of 0 out of 10 pain. Pt is A&Ox1. Only oriented to self due to severe dementia. Bed locked and in lowest position. Call light in reach. Will continue to monitor.

## 2019-04-08 ENCOUNTER — HOME HEALTH ADMISSION (OUTPATIENT)
Dept: HOME HEALTH SERVICES | Facility: HOME HEALTHCARE | Age: 79
End: 2019-04-08
Payer: MEDICARE

## 2019-04-08 PROBLEM — R33.9 URINARY RETENTION: Status: ACTIVE | Noted: 2019-04-08

## 2019-04-08 PROCEDURE — 700102 HCHG RX REV CODE 250 W/ 637 OVERRIDE(OP): Performed by: HOSPITALIST

## 2019-04-08 PROCEDURE — 97165 OT EVAL LOW COMPLEX 30 MIN: CPT

## 2019-04-08 PROCEDURE — 97162 PT EVAL MOD COMPLEX 30 MIN: CPT

## 2019-04-08 PROCEDURE — 700102 HCHG RX REV CODE 250 W/ 637 OVERRIDE(OP): Performed by: FAMILY MEDICINE

## 2019-04-08 PROCEDURE — 99225 PR SUBSEQUENT OBSERVATION CARE,LEVEL II: CPT | Performed by: FAMILY MEDICINE

## 2019-04-08 PROCEDURE — A9270 NON-COVERED ITEM OR SERVICE: HCPCS | Performed by: FAMILY MEDICINE

## 2019-04-08 PROCEDURE — 700111 HCHG RX REV CODE 636 W/ 250 OVERRIDE (IP): Performed by: HOSPITALIST

## 2019-04-08 PROCEDURE — G0378 HOSPITAL OBSERVATION PER HR: HCPCS

## 2019-04-08 PROCEDURE — A9270 NON-COVERED ITEM OR SERVICE: HCPCS | Performed by: HOSPITALIST

## 2019-04-08 PROCEDURE — 51798 US URINE CAPACITY MEASURE: CPT

## 2019-04-08 RX ADMIN — ENOXAPARIN SODIUM 40 MG: 100 INJECTION SUBCUTANEOUS at 05:14

## 2019-04-08 RX ADMIN — TAMSULOSIN HYDROCHLORIDE 0.4 MG: 0.4 CAPSULE ORAL at 09:02

## 2019-04-08 RX ADMIN — CYANOCOBALAMIN TAB 500 MCG 1000 MCG: 500 TAB at 05:14

## 2019-04-08 RX ADMIN — MAGNESIUM OXIDE TAB 400 MG (241.3 MG ELEMENTAL MG) 400 MG: 400 (241.3 MG) TAB at 18:19

## 2019-04-08 RX ADMIN — MAGNESIUM OXIDE TAB 400 MG (241.3 MG ELEMENTAL MG) 400 MG: 400 (241.3 MG) TAB at 05:14

## 2019-04-08 RX ADMIN — VITAMIN D, TAB 1000IU (100/BT) 5000 UNITS: 25 TAB at 05:14

## 2019-04-08 ASSESSMENT — ENCOUNTER SYMPTOMS
SENSORY CHANGE: 0
FOCAL WEAKNESS: 0
FEVER: 0
EYE PAIN: 0
NAUSEA: 0
SORE THROAT: 0
DIARRHEA: 0
HEADACHES: 0
COUGH: 0
SHORTNESS OF BREATH: 0
ABDOMINAL PAIN: 0
SPEECH CHANGE: 0
BACK PAIN: 0
HEARTBURN: 0
EYE REDNESS: 0
EYE DISCHARGE: 0
NECK PAIN: 0
CHILLS: 0
WEAKNESS: 0
WHEEZING: 0
PALPITATIONS: 0
NERVOUS/ANXIOUS: 0
DIZZINESS: 0
VOMITING: 0
BLURRED VISION: 1

## 2019-04-08 ASSESSMENT — COGNITIVE AND FUNCTIONAL STATUS - GENERAL
SUGGESTED CMS G CODE MODIFIER DAILY ACTIVITY: CK
STANDING UP FROM CHAIR USING ARMS: A LITTLE
SUGGESTED CMS G CODE MODIFIER MOBILITY: CK
CLIMB 3 TO 5 STEPS WITH RAILING: A LOT
MOVING FROM LYING ON BACK TO SITTING ON SIDE OF FLAT BED: A LOT
DRESSING REGULAR UPPER BODY CLOTHING: A LITTLE
DAILY ACTIVITIY SCORE: 17
TOILETING: A LOT
HELP NEEDED FOR BATHING: A LITTLE
MOBILITY SCORE: 18
PERSONAL GROOMING: A LITTLE
EATING MEALS: A LITTLE
DRESSING REGULAR LOWER BODY CLOTHING: A LITTLE
WALKING IN HOSPITAL ROOM: A LITTLE

## 2019-04-08 ASSESSMENT — GAIT ASSESSMENTS
GAIT LEVEL OF ASSIST: MINIMAL ASSIST
DEVIATION: SHUFFLED GAIT;DECREASED HEEL STRIKE;DECREASED TOE OFF;OTHER (COMMENT)
DISTANCE (FEET): 50

## 2019-04-08 NOTE — FACE TO FACE
Face to Face Supporting Documentation - Home Health    The encounter with this patient was in whole or in part the primary reason for home health admission.    Date of encounter:   Patient:                    MRN:                       YOB: 2019  Andre Troncoso  [id number]  1940     Home health to see patient for:  Skilled Nursing care for assessment, interventions & education    Skilled need for:  New Onset Medical Diagnosis Fall    Skilled nursing interventions to include:  Comment: PT/OT    Homebound status evidenced by:  Needs the assistance of another person in order to leave the home. Leaving home requires a considerable and taxing effort. There is a normal inability to leave the home.    Community Physician to provide follow up care: BEL Ballard     Optional Interventions? No      I certify the face to face encounter for this home health care referral meets the CMS requirements and the encounter/clinical assessment with the patient was, in whole, or in part, for the medical condition(s) listed above, which is the primary reason for home health care. Based on my clinical findings: the service(s) are medically necessary, support the need for home health care, and the homebound criteria are met.  I certify that this patient has had a face to face encounter by myself.  Mj Shahid M.D. - NPI: [id number]

## 2019-04-08 NOTE — DISCHARGE PLANNING
Anticipated Discharge Disposition: Return to the [facility] with home health.    Action: RN CM phoned the [facility], and received contact information for daughter of pt, Clara Irvin, who can be reached at [phone]. DEBBIE BHATTI spoke with Clara who gave CHOICE for home health which was faxed to [facility] at ext. [phone]. Questions answered. Mallory with the [facility] is going to fax AD to [phone].    Barriers to Discharge: Medical clearance, accepting home health agency.    Plan: Notify team when an accepting home health agency has been obtained.

## 2019-04-08 NOTE — CARE PLAN
Problem: Safety  Goal: Will remain free from falls  Fall precautions in place. Bed wheels locked, bed is in the lowest position. Call light in reach. Bed alarm on and in place. Non-skid socks provided. Patient provides successful verbalization of fall and safety precautions and demonstration of use of call bell. Upper side rails are up. Hourly rounding in progress.     Problem: Urinary Elimination:  Goal: Ability to reestablish a normal urinary elimination pattern will improve  Patient retains urine. 2 x straight caths were done. Will continue to monitor bladder scans.

## 2019-04-08 NOTE — PROGRESS NOTES
Pt self-removed IV, profoundly confused, hx dementia. Anticipated discharge today. Dr Turpin notified, may leave IV out.

## 2019-04-08 NOTE — PROGRESS NOTES
[facility] Medicine Daily Progress Note    Date of Service  4/8/2019    Chief Complaint  78 y.o. male admitted 4/6/2019 with Fall.     Hospital Course  Admitted with fall, noted to have bradycardia in the 40s-50s.    Interval Problem Update  Fall - unknown details, poor historian  Bradycardia - 50s, SBP 120s, denies dizziness or lightheadedness  Blurred vision - patient states chronic  Urinary retention - straight cath X 2    Consultants/Specialty  Consult Palliative care    Code Status  Full    Disposition  Home health    Review of Systems  Review of Systems   Constitutional: Negative for chills, fever and malaise/fatigue.   HENT: Negative for hearing loss and sore throat.    Eyes: Positive for blurred vision. Negative for pain, discharge and redness.   Respiratory: Negative for cough, shortness of breath and wheezing.    Cardiovascular: Negative for chest pain, palpitations and leg swelling.   Gastrointestinal: Negative for abdominal pain, diarrhea, heartburn, nausea and vomiting.   Genitourinary: Negative for dysuria.   Musculoskeletal: Negative for back pain and neck pain.   Skin: Negative for rash.   Neurological: Negative for dizziness, sensory change, speech change, focal weakness, weakness and headaches.   Psychiatric/Behavioral: The patient is not nervous/anxious.         Physical Exam  Temp:  [36.5 °C (97.7 °F)-36.8 °C (98.3 °F)] 36.7 °C (98 °F)  Pulse:  [54-70] 57  Resp:  [14-18] 16  BP: (122-156)/(66-85) 147/80  SpO2:  [90 %-97 %] 96 %    Physical Exam   Constitutional: He appears well-developed and well-nourished.   HENT:   Head: Normocephalic and atraumatic.   Eyes: Pupils are equal, round, and reactive to light. Conjunctivae are normal.   Unable to assess EOMs  Equivocal visual threat OU   Neck: No tracheal deviation present. No thyromegaly present.   Cardiovascular: Normal rate and regular rhythm.    Pulmonary/Chest: Effort normal and breath sounds normal.   Abdominal: Soft. Bowel sounds are normal. He  exhibits no distension. There is no tenderness.   Musculoskeletal: He exhibits no edema.   Lymphadenopathy:     He has no cervical adenopathy.   Neurological: He is alert. No cranial nerve deficit.   Oriented to self only  MMT 5/5   Skin: Skin is warm and dry.   Nursing note and vitals reviewed.      Fluids    Intake/Output Summary (Last 24 hours) at 04/08/19 1523  Last data filed at 04/08/19 1426   Gross per 24 hour   Intake              840 ml   Output             2075 ml   Net            -1235 ml       Laboratory  Recent Labs      04/06/19   0941  04/07/19   0230   WBC  5.2  6.2   RBC  5.02  4.77   HEMOGLOBIN  16.3  15.8   HEMATOCRIT  49.9  46.3   MCV  99.4*  97.1   MCH  32.5  33.1*   MCHC  32.7*  34.1   RDW  47.2  46.3   PLATELETCT  155*  149*   MPV  9.6  9.7     Recent Labs      04/06/19   0941  04/07/19   0230   SODIUM  140  141   POTASSIUM  4.1  4.1   CHLORIDE  109  112   CO2  27  24   GLUCOSE  74  97   BUN  15  20   CREATININE  1.18  0.97   CALCIUM  8.1*  8.2*                   Imaging  EC-ECHOCARDIOGRAM COMPLETE W/O CONT   Final Result      CT-HEAD W/O   Final Result      1.  No evidence of acute intracranial process.      2.  Cerebral atrophy as well as periventricular chronic small vessel ischemic change.      DX-CHEST-PORTABLE (1 VIEW)   Final Result      No evidence of acute cardiopulmonary process.           Assessment/Plan  * Fall- (present on admission)   Assessment & Plan    PT and OT evaluations     Urinary retention- (present on admission)   Assessment & Plan    Flomax  Bladder scan  straight cath prn > 400 cc      Hypomagnesemia- (present on admission)   Assessment & Plan    IV and oral magnesium  Follow level     Vision blurred- (present on admission)   Assessment & Plan    Unknown baseline     Dementia associated with other underlying disease without behavioral disturbance- (present on admission)   Assessment & Plan    Avoid benzodiazepines and anticholinergics  Frequent orientation  Avoid  early morning labs  Avoid vital signs during sleep  Ambulate if possible     Vitamin D deficiency- (present on admission)   Assessment & Plan    replacement     Vitamin B12 deficiency- (present on admission)   Assessment & Plan    oral B12     Bradycardia- (present on admission)   Assessment & Plan    Unknown baseline  Telemetry monitoring          VTE prophylaxis: Lovenox

## 2019-04-08 NOTE — PROGRESS NOTES
Received bedside report from PM nurse. Assumed patient care. Chart reviewed. Pt was resting in bed. A&O x 1, confused. No concerns, complaints or distress. Patient denies pain. POC updated with pt and on the patient communication board. Bed locked and in the lowest position. Call light within reach. Tele box on. Will continue to monitor.    Patient is an 80y Male with h/o End stage COPD on supplemental oxygen, severe pulmonary hypertension, DM, MICHAEL, obesity, CKD 3, recently discharged on 1/3 after being admitted for acute on chronic hypoxic & Hypercapnic respiratory failure due to COPD with acute exacerbation. He presented from [facility] with complaints of Tachycardia and hypotension SBP in the 100s. He denied any CP, worsening SOB, palpitations, change on cough, dizziness, lightheadedness, abdominal pain, change in bowel or urinary habits. Patient is also unhappy with the care he received at [facility] and does not wish to return there and also expressed an interest in hospice. He was admitted for further management.     Assessment and Plan:    1. Acute on Chronic Hypoxic respiratory failure: Due to end stage COPD exacerbation vs. HFpEF with acute decompensation.  Patient still requiring NC 5L and BiPAP qhs.  Continue Bronchodilators ATC and prn and Prednisone 10 mg daily. (Home dose).   Completed Levaquin and Prednisone on his most recent admission.   Maintain sats > 90%. Continue Lasix.  Remains  DNR & DNI. Hospice following but with high oxygen requirements they are not able to rake him. They will reassess in the AM.         2. h/o Hyperkalemia:   Continue Low K diet & Kayaxalate 30 gm po TTS (3x week)       3. h/o Hypertension:  BP borderline low. Monitor BP.  Continue Metoprolol.   Lasix & Amlodipine on hold.       4. Diabetes mellitus, type 2: Uncontrolled.  Hemoglobin A1C, Whole Blood: 8.7 % (11.05.18 @ 07:00)  Monitor FS with Insulin coverage.      5. CKD stage IV:  Stable creatinine.  Avoid Nephrotoxins. Monitor BMP and Electrolytes.  CT abdomen: No hydronephrosis bilaterally or obstructing calculus. Stable bilateral renal cysts measuring up to 9.5 cm at   the right lower pole. Stable 4 cm indeterminate exophytic left interpolar renal lesion.      6. BPH:  Continue Flomax and Finasteride.       7. Obesity:  Dietary modification.      8. B12 deficiency & Vitamin D deficiency:  Continue with home supplementation.       9. MICHAEL/OHVS:  BiPAP at night.      10. Severe Pulmonary Hypertension:  Supportive care  ECHO 11/2018: Normal LVEF, no RWMA, Trace TR, Moderate pulmonary hypertension.  ECHO 12/25/18: severe Right side heart dilation, RSVP 100 (was 40 in 10/2018)      11. Colonization with MDR bacteria of UA:  per ID, clinically no UTI: no dysuria or fevers or increasing wbc.  No antibiotics unless symptomatic.        DVT prophylaxis: Heparin  Disposition: pending hospice re-evaluation.  CODE status: DNR/DNI  Very poor Prognosis.

## 2019-04-08 NOTE — DISCHARGE PLANNING
ATTN: Case Management  RE: Referral for Home Health    As of 04/08/19, we have accepted the Home Health referral for the patient listed above.    A [facility] clinician will be out to see the patient within 48 hours. If you have any questions or concerns regarding the patient’s transition to Home Health, please do not hesitate to contact us at [phone].      We look forward to collaborating with you,  [facility] Health Team

## 2019-04-08 NOTE — CARE PLAN
Problem: Communication  Goal: The ability to communicate needs accurately and effectively will improve  Outcome: PROGRESSING AS EXPECTED    Intervention: [facility] patient and significant other/support system to call light to alert staff of needs   04/07/19 6584   OTHER   Oriented to: All of the Following : Location of Bathroom, Visiting Policy, Unit Routine, Call Light and Bedside Controls, Bedside Rail Policy, Smoking Policy, Rights and Responsibilities, Bedside Report, and Patient Education Notebook         Problem: Infection  Goal: Will remain free from infection  Outcome: PROGRESSING AS EXPECTED  Assessed for signs and symptoms of worsening infection. Treated as indicated. Ensured adequate hydration and nutrients.

## 2019-04-08 NOTE — PROGRESS NOTES
Bladder scan reveals 650ml, pt unable to void on own/upon command at this time. Pt denies bladder discomfort, no distention noted. PRN straight cathed for 650ml at this time. Pt tolerated well. Will continue to monitor.

## 2019-04-08 NOTE — THERAPY
"Physical Therapy Evaluation completed.   Bed Mobility:  Supine to Sit: Minimal Assist (HOB flat, for cueing/safety given vision, no physical assist)  Transfers: Sit to Stand:  (as above)  Gait: Level Of Assist: Minimal Assist with HHA       Plan of Care: Will benefit from Physical Therapy for DC needs  Discharge Recommendations: Equipment: Will Continue to Assess for Equipment Needs and unknown if DME used at baseline. Post-acute therapy: see below.    See \"Rehab Therapy-Acute\" Patient Summary Report for complete documentation.    Patient is a 79 YO male that was admitted 4/6 from [facility] after being found down on floor. Patient with PMHx significant for dementia and macular degeneration. Patient presented to PT with impaired cognition and poor insight into deficits, impaired balance, and impaired coordination. Patient's safety with functional mobility appears to be primarily limited by visual impairments and cognition. Patient required min A for bed mobility for verbal cueing and sequencing, no physical assist required. He performed sit<>stand and ambulated approximately 50ft with min A with HHA, and required max verbal and tactile cueing to avoid obstacles and for balance. Patient is unreliable historian but appears to be near baseline functional mobility. If [facility] able to provide level of assist required, patient may be able to return home following medical DC. Will continue to follow during acute stay for DC needs only.    "

## 2019-04-08 NOTE — DISCHARGE PLANNING
Received Choice form at [id number]  Agency/Facility Name: [facility]  Referral sent per Choice form @ [id number]

## 2019-04-08 NOTE — PROGRESS NOTES
Bedside report received, pt care assumed. Pt is confused however pleasant/cooperative. Reorientation provided, pt educated on plan of care and call bell use- no evidence of learning. Bed alarm in place and pt room close to nursing station. Pt declines any current needs at this time. Will continue to monitor.

## 2019-04-09 ENCOUNTER — PATIENT OUTREACH (OUTPATIENT)
Dept: HEALTH INFORMATION MANAGEMENT | Facility: OTHER | Age: 79
End: 2019-04-09

## 2019-04-09 VITALS
OXYGEN SATURATION: 93 % | RESPIRATION RATE: 18 BRPM | TEMPERATURE: 97.5 F | SYSTOLIC BLOOD PRESSURE: 137 MMHG | WEIGHT: 134.26 LBS | BODY MASS INDEX: 21.03 KG/M2 | DIASTOLIC BLOOD PRESSURE: 78 MMHG | HEART RATE: 61 BPM

## 2019-04-09 PROBLEM — I10 ESSENTIAL HYPERTENSION: Status: ACTIVE | Noted: 2019-04-09

## 2019-04-09 PROCEDURE — A9270 NON-COVERED ITEM OR SERVICE: HCPCS | Performed by: HOSPITALIST

## 2019-04-09 PROCEDURE — 700102 HCHG RX REV CODE 250 W/ 637 OVERRIDE(OP): Performed by: FAMILY MEDICINE

## 2019-04-09 PROCEDURE — 700102 HCHG RX REV CODE 250 W/ 637 OVERRIDE(OP): Performed by: HOSPITALIST

## 2019-04-09 PROCEDURE — 700111 HCHG RX REV CODE 636 W/ 250 OVERRIDE (IP): Performed by: HOSPITALIST

## 2019-04-09 PROCEDURE — G0378 HOSPITAL OBSERVATION PER HR: HCPCS

## 2019-04-09 PROCEDURE — A9270 NON-COVERED ITEM OR SERVICE: HCPCS | Performed by: FAMILY MEDICINE

## 2019-04-09 PROCEDURE — 99217 PR OBSERVATION CARE DISCHARGE: CPT | Performed by: FAMILY MEDICINE

## 2019-04-09 RX ORDER — AMLODIPINE BESYLATE 5 MG/1
5 TABLET ORAL
Status: DISCONTINUED | OUTPATIENT
Start: 2019-04-09 | End: 2019-04-09 | Stop reason: HOSPADM

## 2019-04-09 RX ORDER — TAMSULOSIN HYDROCHLORIDE 0.4 MG/1
0.4 CAPSULE ORAL
Qty: 30 CAP | Refills: 2 | Status: SHIPPED | OUTPATIENT
Start: 2019-04-10

## 2019-04-09 RX ORDER — HYDRALAZINE HYDROCHLORIDE 20 MG/ML
10 INJECTION INTRAMUSCULAR; INTRAVENOUS EVERY 6 HOURS PRN
Status: DISCONTINUED | OUTPATIENT
Start: 2019-04-09 | End: 2019-04-09 | Stop reason: HOSPADM

## 2019-04-09 RX ORDER — AMLODIPINE BESYLATE 5 MG/1
5 TABLET ORAL DAILY
Qty: 30 TAB | Refills: 2 | Status: SHIPPED | OUTPATIENT
Start: 2019-04-10

## 2019-04-09 RX ADMIN — TAMSULOSIN HYDROCHLORIDE 0.4 MG: 0.4 CAPSULE ORAL at 09:42

## 2019-04-09 RX ADMIN — MAGNESIUM OXIDE TAB 400 MG (241.3 MG ELEMENTAL MG) 400 MG: 400 (241.3 MG) TAB at 05:12

## 2019-04-09 RX ADMIN — CYANOCOBALAMIN TAB 500 MCG 1000 MCG: 500 TAB at 05:12

## 2019-04-09 RX ADMIN — AMLODIPINE BESYLATE 5 MG: 5 TABLET ORAL at 09:42

## 2019-04-09 RX ADMIN — VITAMIN D, TAB 1000IU (100/BT) 5000 UNITS: 25 TAB at 05:12

## 2019-04-09 RX ADMIN — ENOXAPARIN SODIUM 40 MG: 100 INJECTION SUBCUTANEOUS at 05:12

## 2019-04-09 RX ADMIN — SENNOSIDES,DOCUSATE SODIUM 2 TABLET: 8.6; 5 TABLET, FILM COATED ORAL at 05:12

## 2019-04-09 NOTE — CARE PLAN
Problem: Safety  Goal: Will remain free from falls  Outcome: PROGRESSING AS EXPECTED  Safety precautions in place. Educated patient who verbalizes understanding, however is confused and needs constant reminders. Non-slip socks on patient. Bed in lowest/locked position and bed alarm on. Room close to nursing station with frequent rounding. Call light placed in patient hand.     Problem: Infection  Goal: Will remain free from infection  Outcome: PROGRESSING AS EXPECTED  Standard precautions in place. Hand hygiene used appropriately prior to and after patient contact by staff.

## 2019-04-09 NOTE — PROGRESS NOTES
Received bedside report from RN, pt care assumed, VSS, pt assessment complete. Pt AAOx1, pt doesn't c/o pain at this time. No signs of acute distress noted at this time. POC discussed with pt and verbalizes no questions. Pt denies any additional needs at this time. Bed in lowest position, bed alarm on, pt educated on fall risk and verbalized understanding, call light within reach, hourly rounding initiated.

## 2019-04-09 NOTE — DISCHARGE PLANNING
"Elayne with [facility] notified of 5pm transport time today with [facility]. \"mailbox is full\" message on daughter's phone, [phone], to inform of transport time. DC summary to be faxed to [phone].  "

## 2019-04-09 NOTE — PROGRESS NOTES
HTN noted this hs SBP 170s sustained for approx 2h duration, pt denies pain/headache lightheadedness/dizziness. Upon reassessment approx q30min later, BP of 154/67. Dr Fish notified, will continue to monitor.

## 2019-04-09 NOTE — DISCHARGE PLANNING
RN CM has message out to Malloyr at the [facility] at the [facility] to call back to discuss dc plan for today and transport back to the [facility].

## 2019-04-09 NOTE — DISCHARGE INSTRUCTIONS
Discharge Instructions    Discharged to other by medical transportation with escort. Discharged via wheelchair, hospital escort: Yes.  Special equipment needed: Not Applicable    Be sure to schedule a follow-up appointment with your primary care doctor or any specialists as instructed.     Discharge Plan:   Diet Plan: Discussed  Activity Level: Discussed  Smoking Cessation Offered: Patient Refused  Confirmed Follow up Appointment: Appointment Scheduled  Confirmed Symptoms Management: Discussed  Medication Reconciliation Updated: Yes  Pneumococcal Vaccine Administered/Refused: Not given - Patient refused pneumococcal vaccine  Influenza Vaccine Indication: Not indicated: Previously immunized this influenza season and > 8 years of age    I understand that a diet low in cholesterol, fat, and sodium is recommended for good health. Unless I have been given specific instructions below for another diet, I accept this instruction as my diet prescription.   Other diet: Cardiac, low sodium diet    Special Instructions: None    · Is patient discharged on Warfarin / Coumadin?   No     Depression / Suicide Risk    As you are discharged from this [facility] facility, it is important to learn how to keep safe from harming yourself.    Recognize the warning signs:  · Abrupt changes in personality, positive or negative- including increase in energy   · Giving away possessions  · Change in eating patterns- significant weight changes-  positive or negative  · Change in sleeping patterns- unable to sleep or sleeping all the time   · Unwillingness or inability to communicate  · Depression  · Unusual sadness, discouragement and loneliness  · Talk of wanting to die  · Neglect of personal appearance   · Rebelliousness- reckless behavior  · Withdrawal from people/activities they love  · Confusion- inability to concentrate     If you or a loved one observes any of these behaviors or has concerns about self-harm, here's what you can  do:  · Talk about it- your feelings and reasons for harming yourself  · Remove any means that you might use to hurt yourself (examples: pills, rope, extension cords, firearm)  · Get professional help from the community (Mental Health, Substance Abuse, psychological counseling)  · Do not be alone:Call your Safe Contact- someone whom you trust who will be there for you.  · Call your local CRISIS HOTLINE 605-6388 or 592-149-6537  · Call your local Children's Mobile Crisis Response Team [place] (809) 154-8700 or wwwi-dispo.com  · Call the toll free National Suicide Prevention Hotlines   · National Suicide Prevention Lifeline 784-916-WDSH (3040)  · National [employer] Line Network 800-SUICIDE (132-6721)      Syncope  Introduction  Syncope is when you lose temporarily pass out (faint). Signs that you may be about to pass out include:  · Feeling dizzy or light-headed.  · Feeling sick to your stomach (nauseous).  · Seeing all white or all black.  · Having cold, clammy skin.  If you passed out, get help right away. Call your local emergency services (911 in the [place] Do not drive yourself to the hospital.  Follow these instructions at home:  Pay attention to any changes in your symptoms. Take these actions to help with your condition:  · Have someone stay with you until you feel stable.  · Do not drive, use machinery, or play sports until your doctor says it is okay.  · Keep all follow-up visits as told by your doctor. This is important.  · If you start to feel like you might pass out, lie down right away and raise (elevate) your feet above the level of your heart. Breathe deeply and steadily. Wait until all of the symptoms are gone.  · Drink enough fluid to keep your pee (urine) clear or pale yellow.  · If you are taking blood pressure or heart medicine, get up slowly and spend many minutes getting ready to sit and then stand. This can help with dizziness.  · Take over-the-counter and prescription medicines only as  told by your doctor.  Get help right away if:  · You have a very bad headache.  · You have unusual pain in your chest, tummy, or back.  · You are bleeding from your mouth or rectum.  · You have black or tarry poop (stool).  · You have a very fast or uneven heartbeat (palpitations).  · It hurts to breathe.  · You pass out once or more than once.  · You have jerky movements that you cannot control (seizure).  · You are confused.  · You have trouble walking.  · You are very weak.  · You have vision problems.  These symptoms may be an emergency. Do not wait to see if the symptoms will go away. Get medical help right away. Call your local emergency services (911 in the [place] Do not drive yourself to the hospital.   This information is not intended to replace advice given to you by your health care provider. Make sure you discuss any questions you have with your health care provider.  Document Released: 06/05/2009 Document Revised: 05/25/2017 Document Reviewed: 08/31/2016  © 2017 [employer]    Holt Catheter Care, Adult  A Holt catheter is a soft, flexible tube that is placed into the bladder to drain urine. A Holt catheter may be inserted if:  · You leak urine or are not able to control when you urinate (urinary incontinence).  · You are not able to urinate when you need to (urinary retention).  · You had prostate surgery or surgery on the genitals.  · You have certain medical conditions, such as multiple sclerosis, dementia, or a spinal cord injury.  If you are going home with a Holt catheter in place, follow the instructions below.  TAKING CARE OF THE CATHETER  1. Wash your hands with soap and water.  2. Using mild soap and warm water on a clean washcloth:  ¨ Clean the area on your body closest to the catheter insertion site using a circular motion, moving away from the catheter. Never wipe toward the catheter because this could sweep bacteria up into the urethra and cause infection.  ¨ Remove all traces of soap. Pat  the area dry with a clean towel. For males, reposition the foreskin.  3. Attach the catheter to your leg so there is no tension on the catheter. Use adhesive tape or a leg strap. If you are using adhesive tape, remove any sticky residue left behind by the previous tape you used.  4. Keep the drainage bag below the level of the bladder, but keep it off the floor.  5. Check throughout the day to be sure the catheter is working and urine is draining freely. Make sure the tubing does not become kinked.  6. Do not pull on the catheter or try to remove it. Pulling could damage internal tissues.  TAKING CARE OF THE DRAINAGE BAGS  You will be given two drainage bags to take home. One is a large overnight drainage bag, and the other is a smaller leg bag that fits underneath clothing. You may wear the overnight bag at any time, but you should never wear the smaller leg bag at night. Follow the instructions below for how to empty, change, and clean your drainage bags.  Emptying the Drainage Bag  You must empty your drainage bag when it is  -½ full or at least 2-3 times a day.  2. Wash your hands with soap and water.  3. Keep the drainage bag below your hips, below the level of your bladder. This stops urine from going back into the tubing and into your bladder.  4. Hold the dirty bag over the toilet or a clean container.  5. Open the pour spout at the bottom of the bag and empty the urine into the toilet or container. Do not let the pour spout touch the toilet, container, or any other surface. Doing so can place bacteria on the bag, which can cause an infection.  6. Clean the pour spout with a gauze pad or cotton ball that has rubbing alcohol on it.  7. Close the pour spout.  8. Attach the bag to your leg with adhesive tape or a leg strap.  9. Wash your hands well.  Changing the Drainage Bag  Change your drainage bag once a month or sooner if it starts to smell bad or look dirty. Below are steps to follow when changing the  drainage bag.  2. Wash your hands with soap and water.  3. Pinch off the rubber catheter so that urine does not spill out.  4. Disconnect the catheter tube from the drainage tube at the connection valve. Do not let the tubes touch any surface.  5. Clean the end of the catheter tube with an alcohol wipe. Use a different alcohol wipe to clean the end of the drainage tube.  6. Connect the catheter tube to the drainage tube of the clean drainage bag.  7. Attach the new bag to the leg with adhesive tape or a leg strap. Avoid attaching the new bag too tightly.  8. Wash your hands well.  Cleaning the Drainage Bag  1. Wash your hands with soap and water.  2. Wash the bag in warm, soapy water.  3. Rinse the bag thoroughly with warm water.  4. Fill the bag with a solution of white vinegar and water (1 cup vinegar to 1 qt warm water [.2 L vinegar to 1 L warm water]). Close the bag and soak it for 30 minutes in the solution.  5. Rinse the bag with warm water.  6. Hang the bag to dry with the pour spout open and hanging downward.  7. Store the clean bag (once it is dry) in a clean plastic bag.  8. Wash your hands well.  PREVENTING INFECTION  · Wash your hands before and after handling your catheter.  · Take showers daily and wash the area where the catheter enters your body. Do not take baths. Replace wet leg straps with dry ones, if this applies.  · Do not use powders, sprays, or lotions on the genital area. Only use creams, lotions, or ointments as directed by your caregiver.  · For females, wipe from front to back after each bowel movement.  · Drink enough fluids to keep your urine clear or pale yellow unless you have a fluid restriction.  · Do not let the drainage bag or tubing touch or lie on the floor.  · Wear cotton underwear to absorb moisture and to keep your .  SEEK MEDICAL CARE IF:   · Your urine is cloudy or smells unusually bad.  · Your catheter becomes clogged.  · You are not draining urine into the bag  or your bladder feels full.  · Your catheter starts to leak.  SEEK IMMEDIATE MEDICAL CARE IF:   · You have pain, swelling, redness, or pus where the catheter enters the body.  · You have pain in the abdomen, legs, lower back, or bladder.  · You have a fever.  · You see blood fill the catheter, or your urine is pink or red.  · You have nausea, vomiting, or chills.  · Your catheter gets pulled out.  MAKE SURE YOU:   · Understand these instructions.  · Will watch your condition.  · Will get help right away if you are not doing well or get worse.     This information is not intended to replace advice given to you by your health care provider. Make sure you discuss any questions you have with your health care provider.     Document Released: 12/18/2006 Document Revised: 05/04/2015 Document Reviewed: 12/09/2013  [employer] Interactive Patient Education ©2016 [employer]

## 2019-04-09 NOTE — PROGRESS NOTES
Bladder scan showing 524 of urinary retention. Holt catheter inserted, 16 Nauruan. Patient tolerated well.

## 2019-04-09 NOTE — DISCHARGE SUMMARY
Discharge Summary    CHIEF COMPLAINT ON ADMISSION  Chief Complaint   Patient presents with   • T-5000 GLF   • Syncope       Reason for Admission  Syncopal     CODE STATUS  Full Code    HPI & HOSPITAL COURSE  This is a 78 y.o. male here with fall, questionable syncope.  Patient was noted to be bradycardic in the 40s-50s on admission.  His blood pressure appeared to be quite stable.  He was bradycardic in the high 50s.  He did not complain of any dizziness lightheadedness.  Patient does a poor historian, appears to have advanced dementia.  Also appeared to have blurred vision, unknown baseline but patient states this is chronic.  Was also noted to have urinary retention straight catheterization that was done, he was started on Flomax, he now has a Holt catheter in place will need to follow-up with urology as outpatient.  Was also notes of vitamin B12 vitamin D deficiency and start replacement.  His magnesium was low and was replaced.  For his hypertension he was started on amlodipine with good blood pressure control.       Therefore, he is discharged in fair and stable condition to home with close outpatient follow-up.    The patient recovered much more quickly than anticipated on admission.      FOLLOW UP ITEMS POST DISCHARGE  Follow up with Urology    DISCHARGE DIAGNOSES  Principal Problem:    Fall POA: Yes  Active Problems:    Bradycardia POA: Yes    Vitamin B12 deficiency POA: Yes    Vitamin D deficiency POA: Yes    Dementia associated with other underlying disease without behavioral disturbance POA: Yes    Vision blurred POA: Yes    Hypomagnesemia POA: Yes    Urinary retention POA: Yes    Essential hypertension POA: Yes  Resolved Problems:    * No resolved hospital problems. *      FOLLOW UP  No future appointments.  GARRY BallardPKalpeshNKalpesh  [address]  [facility] [address]  [place] [address]  [phone]      [facility]   left a message for your provider regarding need for a follow up appointment. Please call their  office directly if you do not hear from them with in 2 days. Thank you      MEDICATIONS ON DISCHARGE     Medication List      START taking these medications      Instructions   amLODIPine 5 MG Tabs  Start taking on:  4/10/2019  Commonly known as:  NORVASC   Take 1 Tab by mouth every day.  Dose:  5 mg     cyanocobalamin 1000 MCG Tabs  Start taking on:  4/10/2019  Commonly known as:  VITAMIN B12   Take 1 Tab by mouth every day.  Dose:  1000 mcg     tamsulosin 0.4 MG capsule  Start taking on:  4/10/2019  Commonly known as:  FLOMAX   Take 1 Cap by mouth ONE-HALF HOUR AFTER BREAKFAST.  Dose:  0.4 mg        CONTINUE taking these medications      Instructions   acetaminophen 500 MG Tabs  Commonly known as:  TYLENOL   Take 500 mg by mouth every four hours as needed.  Dose:  500 mg     vitamin D 1000 UNIT Tabs  Commonly known as:  cholecalciferol   Take 5,000 Units by mouth every day.  Dose:  5000 Units        STOP taking these medications    permethrin 5 % Crea  Commonly known as:  ELIMITE            Allergies  No Known Allergies    DIET  Orders Placed This Encounter   Procedures   • Diet Order Regular     Standing Status:   Standing     Number of Occurrences:   1     Order Specific Question:   Diet:     Answer:   Regular [1]       ACTIVITY  As tolerated.  Weight bearing as tolerated    LINES, DRAINS, AND WOUNDS  This is an automated list. Peripheral IVs will be removed prior to discharge.     Urethral Catheter 16 Fr. (Active)   Site Assessment Clean;Skin intact 4/8/2019  8:00 PM   Collection Container Standard drainage bag 4/8/2019  8:00 PM   Urinary Catheter Care Tamper Evident Seal in Place;Drainage Tube Extended;Drainage Bag Not Overfilled;Drainage Tubing Properly Secured;Drainage Bag Below Bladder Level and Not on Floor;Cath Care Done with Soap & Water 4/8/2019  8:00 PM   Securement Method Securing device (Describe) 4/8/2019  8:00 PM   Output (mL) 1000 mL 4/9/2019  6:00 AM                Urethral Catheter 16 Fr.  (Active)   Site Assessment Clean;Skin intact 4/8/2019  8:00 PM   Collection Container Standard drainage bag 4/8/2019  8:00 PM   Urinary Catheter Care Tamper Evident Seal in Place;Drainage Tube Extended;Drainage Bag Not Overfilled;Drainage Tubing Properly Secured;Drainage Bag Below Bladder Level and Not on Floor;Cath Care Done with Soap & Water 4/8/2019  8:00 PM   Securement Method Securing device (Describe) 4/8/2019  8:00 PM   Output (mL) 1000 mL 4/9/2019  6:00 AM        MENTAL STATUS ON TRANSFER  Level of Consciousness: Confused  Orientation : Disoriented to Event, Disoriented to Place, Disoriented to Time  Speech: Speech Clear    CONSULTATIONS  None    PROCEDURES  None    LABORATORY  Lab Results   Component Value Date    SODIUM 141 04/07/2019    POTASSIUM 4.1 04/07/2019    CHLORIDE 112 04/07/2019    CO2 24 04/07/2019    GLUCOSE 97 04/07/2019    BUN 20 04/07/2019    CREATININE 0.97 04/07/2019        Lab Results   Component Value Date    WBC 6.2 04/07/2019    HEMOGLOBIN 15.8 04/07/2019    HEMATOCRIT 46.3 04/07/2019    PLATELETCT 149 (L) 04/07/2019        Total time of the discharge process exceeds 30 minutes.

## 2019-04-09 NOTE — PROGRESS NOTES
Report received. Patient's plan of care reviewed. Patient found eating dinner in bed. A&Ox1 to self, confused. All needs met at this time. Safety precautions in place. Non-slip socks on patient. Bed in lowest/locked position. Bed alarm on. Will continue to monitor.

## 2019-04-09 NOTE — DISCHARGE PLANNING
Received Transport Form @ 6250  Spoke to Taylor @ [facility]    Transport is scheduled for 4/9 @1700 going to [facility].

## 2019-04-10 NOTE — PROGRESS NOTES
[facility] Transport tech at bedside to transport pt to [facility]. Transport ID form filled out and placed in chart as pt is a/o x1 only. All belongings with pt. Telemetry monitor taken off.

## 2019-04-11 ENCOUNTER — HOME CARE VISIT (OUTPATIENT)
Dept: HOME HEALTH SERVICES | Facility: HOME HEALTHCARE | Age: 79
End: 2019-04-11
Payer: MEDICARE

## 2019-04-11 ENCOUNTER — TELEPHONE (OUTPATIENT)
Dept: HEALTH INFORMATION MANAGEMENT | Facility: OTHER | Age: 79
End: 2019-04-11

## 2019-04-11 VITALS
WEIGHT: 134 LBS | RESPIRATION RATE: 16 BRPM | TEMPERATURE: 98.4 F | HEART RATE: 64 BPM | BODY MASS INDEX: 19.85 KG/M2 | DIASTOLIC BLOOD PRESSURE: 89 MMHG | HEIGHT: 69 IN | SYSTOLIC BLOOD PRESSURE: 118 MMHG | OXYGEN SATURATION: 99 %

## 2019-04-11 PROCEDURE — 665998 HH PPS REVENUE CREDIT

## 2019-04-11 PROCEDURE — 665999 HH PPS REVENUE DEBIT

## 2019-04-11 PROCEDURE — G0493 RN CARE EA 15 MIN HH/HOSPICE: HCPCS

## 2019-04-11 PROCEDURE — 665001 SOC-HOME HEALTH

## 2019-04-11 ASSESSMENT — ENCOUNTER SYMPTOMS
POOR JUDGMENT: 1
DIFFICULTY THINKING: 1

## 2019-04-11 ASSESSMENT — ACTIVITIES OF DAILY LIVING (ADL): OASIS_M1830: 03

## 2019-04-11 NOTE — TELEPHONE ENCOUNTER
Received referral from [facility]. Medications reviewed. No clinically significant interactions noted.     Violet Martin, PharmD

## 2019-04-12 PROCEDURE — 665999 HH PPS REVENUE DEBIT

## 2019-04-12 PROCEDURE — 665998 HH PPS REVENUE CREDIT

## 2019-04-13 ENCOUNTER — HOME CARE VISIT (OUTPATIENT)
Dept: HOME HEALTH SERVICES | Facility: HOME HEALTHCARE | Age: 79
End: 2019-04-13
Payer: MEDICARE

## 2019-04-13 VITALS
SYSTOLIC BLOOD PRESSURE: 132 MMHG | OXYGEN SATURATION: 98 % | RESPIRATION RATE: 16 BRPM | HEART RATE: 63 BPM | TEMPERATURE: 97.2 F | DIASTOLIC BLOOD PRESSURE: 82 MMHG

## 2019-04-13 PROCEDURE — 665999 HH PPS REVENUE DEBIT

## 2019-04-13 PROCEDURE — 665998 HH PPS REVENUE CREDIT

## 2019-04-13 PROCEDURE — G0151 HHCP-SERV OF PT,EA 15 MIN: HCPCS

## 2019-04-13 ASSESSMENT — ACTIVITIES OF DAILY LIVING (ADL)
IADLS_COMMENTS: <!--EPICS-->SEE OT EVAL<!--EPICE-->
ADLS_COMMENTS: <!--EPICS-->SEE OT EVAL<!--EPICE-->

## 2019-04-13 ASSESSMENT — ENCOUNTER SYMPTOMS: DIFFICULTY THINKING: 1

## 2019-04-14 PROCEDURE — 665999 HH PPS REVENUE DEBIT

## 2019-04-14 PROCEDURE — 665998 HH PPS REVENUE CREDIT

## 2019-04-15 ENCOUNTER — HOME CARE VISIT (OUTPATIENT)
Dept: HOME HEALTH SERVICES | Facility: HOME HEALTHCARE | Age: 79
End: 2019-04-15
Payer: MEDICARE

## 2019-04-15 PROCEDURE — 665998 HH PPS REVENUE CREDIT

## 2019-04-15 PROCEDURE — 665999 HH PPS REVENUE DEBIT

## 2019-04-15 PROCEDURE — G0495 RN CARE TRAIN/EDU IN HH: HCPCS

## 2019-04-16 ENCOUNTER — HOME CARE VISIT (OUTPATIENT)
Dept: HOME HEALTH SERVICES | Facility: HOME HEALTHCARE | Age: 79
End: 2019-04-16
Payer: MEDICARE

## 2019-04-16 VITALS
RESPIRATION RATE: 16 BRPM | DIASTOLIC BLOOD PRESSURE: 80 MMHG | SYSTOLIC BLOOD PRESSURE: 134 MMHG | TEMPERATURE: 99 F | OXYGEN SATURATION: 96 % | HEART RATE: 62 BPM

## 2019-04-16 PROCEDURE — 665999 HH PPS REVENUE DEBIT

## 2019-04-16 PROCEDURE — G0152 HHCP-SERV OF OT,EA 15 MIN: HCPCS

## 2019-04-16 PROCEDURE — 665998 HH PPS REVENUE CREDIT

## 2019-04-16 SDOH — ECONOMIC STABILITY: HOUSING INSECURITY: HOME SAFETY: PT IS A RESIDENT IN A DEMETIA CARE FACILITY. CLEAN. WELL KEPT

## 2019-04-16 ASSESSMENT — ACTIVITIES OF DAILY LIVING (ADL)
LAUNDRY_ASSISTANCE: 6
DRESSING_UB_ASSISTANCE: 4
TOILETING_ASSISTANCE: 4
EATING_ASSISTANCE: 0
TRANSPORTATION_ASSISTANCE: 6
ADLS_COMMENTS: EPICE-->
ORAL_CARE_ASSISTANCE: 4
SHOPPING_ASSISTANCE: 6
GROOMING_ASSISTANCE: 4
TELEPHONE_ASSISTANCE: 6
DRESSING_LB_ASSISTANCE: 4
HOUSEKEEPING_ASSISTANCE: 6
MEAL_PREP_ASSISTANCE: 6
BATHING_ASSISTIVE_EQUIPMENT_USED: SHOWER CHAIR, GRAB BARS
BATHING_ASSISTANCE: 5

## 2019-04-16 ASSESSMENT — ENCOUNTER SYMPTOMS
POOR JUDGMENT: 1
DIFFICULTY THINKING: 1

## 2019-04-17 ENCOUNTER — HOME CARE VISIT (OUTPATIENT)
Dept: HOME HEALTH SERVICES | Facility: HOME HEALTHCARE | Age: 79
End: 2019-04-17
Payer: MEDICARE

## 2019-04-17 VITALS
OXYGEN SATURATION: 98 % | HEART RATE: 66 BPM | TEMPERATURE: 97.8 F | SYSTOLIC BLOOD PRESSURE: 124 MMHG | RESPIRATION RATE: 16 BRPM | DIASTOLIC BLOOD PRESSURE: 84 MMHG

## 2019-04-17 PROCEDURE — 665998 HH PPS REVENUE CREDIT

## 2019-04-17 PROCEDURE — 665999 HH PPS REVENUE DEBIT

## 2019-04-17 ASSESSMENT — ENCOUNTER SYMPTOMS: POOR JUDGMENT: 1

## 2019-04-18 ENCOUNTER — HOME CARE VISIT (OUTPATIENT)
Dept: HOME HEALTH SERVICES | Facility: HOME HEALTHCARE | Age: 79
End: 2019-04-18
Payer: MEDICARE

## 2019-04-18 PROCEDURE — G0493 RN CARE EA 15 MIN HH/HOSPICE: HCPCS

## 2019-04-18 PROCEDURE — 665999 HH PPS REVENUE DEBIT

## 2019-04-18 PROCEDURE — 665998 HH PPS REVENUE CREDIT

## 2019-04-19 VITALS
DIASTOLIC BLOOD PRESSURE: 70 MMHG | SYSTOLIC BLOOD PRESSURE: 136 MMHG | RESPIRATION RATE: 16 BRPM | TEMPERATURE: 97.8 F | HEART RATE: 75 BPM | OXYGEN SATURATION: 98 %

## 2019-04-19 PROCEDURE — 665999 HH PPS REVENUE DEBIT

## 2019-04-19 PROCEDURE — 665998 HH PPS REVENUE CREDIT

## 2019-04-19 ASSESSMENT — ENCOUNTER SYMPTOMS
NAUSEA: DENIES
VOMITING: DENIES

## 2019-04-20 PROCEDURE — 665999 HH PPS REVENUE DEBIT

## 2019-04-20 PROCEDURE — 665998 HH PPS REVENUE CREDIT

## 2019-04-21 ENCOUNTER — HOME CARE VISIT (OUTPATIENT)
Dept: HOME HEALTH SERVICES | Facility: HOME HEALTHCARE | Age: 79
End: 2019-04-21
Payer: MEDICARE

## 2019-04-21 VITALS
RESPIRATION RATE: 16 BRPM | DIASTOLIC BLOOD PRESSURE: 85 MMHG | OXYGEN SATURATION: 95 % | SYSTOLIC BLOOD PRESSURE: 115 MMHG | TEMPERATURE: 97.7 F | HEART RATE: 71 BPM

## 2019-04-21 PROCEDURE — G0299 HHS/HOSPICE OF RN EA 15 MIN: HCPCS

## 2019-04-21 PROCEDURE — 665999 HH PPS REVENUE DEBIT

## 2019-04-21 PROCEDURE — 665998 HH PPS REVENUE CREDIT

## 2019-04-22 ENCOUNTER — HOME CARE VISIT (OUTPATIENT)
Dept: HOME HEALTH SERVICES | Facility: HOME HEALTHCARE | Age: 79
End: 2019-04-22
Payer: MEDICARE

## 2019-04-22 VITALS
SYSTOLIC BLOOD PRESSURE: 136 MMHG | OXYGEN SATURATION: 99 % | HEART RATE: 66 BPM | TEMPERATURE: 97.4 F | DIASTOLIC BLOOD PRESSURE: 70 MMHG

## 2019-04-22 PROCEDURE — 665998 HH PPS REVENUE CREDIT

## 2019-04-22 PROCEDURE — G0299 HHS/HOSPICE OF RN EA 15 MIN: HCPCS

## 2019-04-22 PROCEDURE — 665999 HH PPS REVENUE DEBIT

## 2019-04-22 ASSESSMENT — ENCOUNTER SYMPTOMS
POOR JUDGMENT: 1
DIFFICULTY THINKING: 1

## 2019-04-23 PROCEDURE — 665999 HH PPS REVENUE DEBIT

## 2019-04-23 PROCEDURE — 665998 HH PPS REVENUE CREDIT

## 2019-04-24 PROCEDURE — 665998 HH PPS REVENUE CREDIT

## 2019-04-24 PROCEDURE — 665999 HH PPS REVENUE DEBIT

## 2019-04-25 ENCOUNTER — HOME CARE VISIT (OUTPATIENT)
Dept: HOME HEALTH SERVICES | Facility: HOME HEALTHCARE | Age: 79
End: 2019-04-25
Payer: MEDICARE

## 2019-04-25 PROCEDURE — G0299 HHS/HOSPICE OF RN EA 15 MIN: HCPCS

## 2019-04-25 PROCEDURE — 665998 HH PPS REVENUE CREDIT

## 2019-04-25 PROCEDURE — 665999 HH PPS REVENUE DEBIT

## 2019-04-26 PROCEDURE — 665998 HH PPS REVENUE CREDIT

## 2019-04-26 PROCEDURE — 665999 HH PPS REVENUE DEBIT

## 2019-04-27 PROCEDURE — 665998 HH PPS REVENUE CREDIT

## 2019-04-27 PROCEDURE — 665999 HH PPS REVENUE DEBIT

## 2019-04-28 PROCEDURE — 665998 HH PPS REVENUE CREDIT

## 2019-04-28 PROCEDURE — 665999 HH PPS REVENUE DEBIT

## 2019-04-29 ENCOUNTER — HOME CARE VISIT (OUTPATIENT)
Dept: HOME HEALTH SERVICES | Facility: HOME HEALTHCARE | Age: 79
End: 2019-04-29
Payer: MEDICARE

## 2019-04-29 VITALS
DIASTOLIC BLOOD PRESSURE: 85 MMHG | HEART RATE: 69 BPM | OXYGEN SATURATION: 98 % | SYSTOLIC BLOOD PRESSURE: 122 MMHG | TEMPERATURE: 97.7 F | RESPIRATION RATE: 17 BRPM

## 2019-04-29 PROCEDURE — 665999 HH PPS REVENUE DEBIT

## 2019-04-29 PROCEDURE — G0299 HHS/HOSPICE OF RN EA 15 MIN: HCPCS

## 2019-04-29 PROCEDURE — 665998 HH PPS REVENUE CREDIT

## 2019-04-29 ASSESSMENT — ENCOUNTER SYMPTOMS
DIFFICULTY THINKING: 1
POOR JUDGMENT: 1

## 2019-04-30 ENCOUNTER — HOME CARE VISIT (OUTPATIENT)
Dept: HOME HEALTH SERVICES | Facility: HOME HEALTHCARE | Age: 79
End: 2019-04-30
Payer: MEDICARE

## 2019-04-30 VITALS
TEMPERATURE: 99.2 F | HEART RATE: 69 BPM | SYSTOLIC BLOOD PRESSURE: 100 MMHG | RESPIRATION RATE: 16 BRPM | DIASTOLIC BLOOD PRESSURE: 70 MMHG | OXYGEN SATURATION: 99 %

## 2019-04-30 PROCEDURE — 665999 HH PPS REVENUE DEBIT

## 2019-04-30 PROCEDURE — 665998 HH PPS REVENUE CREDIT

## 2019-04-30 ASSESSMENT — ENCOUNTER SYMPTOMS
DIFFICULTY THINKING: 1
POOR JUDGMENT: 1

## 2019-05-01 ENCOUNTER — HOME CARE VISIT (OUTPATIENT)
Dept: HOME HEALTH SERVICES | Facility: HOME HEALTHCARE | Age: 79
End: 2019-05-01
Payer: MEDICARE

## 2019-05-01 PROCEDURE — 665998 HH PPS REVENUE CREDIT

## 2019-05-01 PROCEDURE — 665999 HH PPS REVENUE DEBIT

## 2019-05-01 PROCEDURE — G0299 HHS/HOSPICE OF RN EA 15 MIN: HCPCS

## 2019-05-02 ENCOUNTER — HOME CARE VISIT (OUTPATIENT)
Dept: HOME HEALTH SERVICES | Facility: HOME HEALTHCARE | Age: 79
End: 2019-05-02
Payer: MEDICARE

## 2019-05-02 PROCEDURE — 665998 HH PPS REVENUE CREDIT

## 2019-05-02 PROCEDURE — 665999 HH PPS REVENUE DEBIT

## 2019-05-03 PROCEDURE — 665998 HH PPS REVENUE CREDIT

## 2019-05-03 PROCEDURE — 665999 HH PPS REVENUE DEBIT

## 2019-05-04 PROCEDURE — 665999 HH PPS REVENUE DEBIT

## 2019-05-04 PROCEDURE — 665998 HH PPS REVENUE CREDIT

## 2019-05-05 VITALS
DIASTOLIC BLOOD PRESSURE: 65 MMHG | TEMPERATURE: 98.8 F | RESPIRATION RATE: 15 BRPM | HEART RATE: 65 BPM | SYSTOLIC BLOOD PRESSURE: 106 MMHG | OXYGEN SATURATION: 97 %

## 2019-05-05 PROCEDURE — 665998 HH PPS REVENUE CREDIT

## 2019-05-05 PROCEDURE — 665999 HH PPS REVENUE DEBIT

## 2019-05-05 ASSESSMENT — ENCOUNTER SYMPTOMS: DIFFICULTY THINKING: 1

## 2019-05-06 ENCOUNTER — HOME CARE VISIT (OUTPATIENT)
Dept: HOME HEALTH SERVICES | Facility: HOME HEALTHCARE | Age: 79
End: 2019-05-06
Payer: MEDICARE

## 2019-05-06 VITALS
DIASTOLIC BLOOD PRESSURE: 72 MMHG | SYSTOLIC BLOOD PRESSURE: 112 MMHG | RESPIRATION RATE: 16 BRPM | OXYGEN SATURATION: 94 % | TEMPERATURE: 98.7 F | HEART RATE: 68 BPM

## 2019-05-06 PROCEDURE — G0299 HHS/HOSPICE OF RN EA 15 MIN: HCPCS

## 2019-05-06 PROCEDURE — 665999 HH PPS REVENUE DEBIT

## 2019-05-06 PROCEDURE — 665998 HH PPS REVENUE CREDIT

## 2019-05-06 ASSESSMENT — ENCOUNTER SYMPTOMS
DIFFICULTY THINKING: 1
VOMITING: DENIES
NAUSEA: DENIES
POOR JUDGMENT: 1

## 2019-05-07 PROCEDURE — 665998 HH PPS REVENUE CREDIT

## 2019-05-07 PROCEDURE — 665999 HH PPS REVENUE DEBIT

## 2019-05-08 ENCOUNTER — HOME CARE VISIT (OUTPATIENT)
Dept: HOME HEALTH SERVICES | Facility: HOME HEALTHCARE | Age: 79
End: 2019-05-08
Payer: MEDICARE

## 2019-05-08 VITALS
RESPIRATION RATE: 18 BRPM | OXYGEN SATURATION: 98 % | DIASTOLIC BLOOD PRESSURE: 80 MMHG | HEART RATE: 86 BPM | TEMPERATURE: 99.7 F | SYSTOLIC BLOOD PRESSURE: 100 MMHG

## 2019-05-08 PROCEDURE — 665999 HH PPS REVENUE DEBIT

## 2019-05-08 PROCEDURE — G0493 RN CARE EA 15 MIN HH/HOSPICE: HCPCS

## 2019-05-08 PROCEDURE — G0299 HHS/HOSPICE OF RN EA 15 MIN: HCPCS

## 2019-05-08 PROCEDURE — 665998 HH PPS REVENUE CREDIT

## 2019-05-08 ASSESSMENT — ENCOUNTER SYMPTOMS
DIFFICULTY THINKING: 1
POOR JUDGMENT: 1

## 2019-05-09 ENCOUNTER — HOSPITAL ENCOUNTER (EMERGENCY)
Facility: MEDICAL CENTER | Age: 79
End: 2019-05-09
Attending: EMERGENCY MEDICINE
Payer: MEDICARE

## 2019-05-09 ENCOUNTER — HOME CARE VISIT (OUTPATIENT)
Dept: HOME HEALTH SERVICES | Facility: HOME HEALTHCARE | Age: 79
End: 2019-05-09

## 2019-05-09 VITALS
TEMPERATURE: 99.1 F | RESPIRATION RATE: 16 BRPM | BODY MASS INDEX: 19.85 KG/M2 | OXYGEN SATURATION: 93 % | HEIGHT: 69 IN | SYSTOLIC BLOOD PRESSURE: 132 MMHG | WEIGHT: 134 LBS | DIASTOLIC BLOOD PRESSURE: 75 MMHG | HEART RATE: 99 BPM

## 2019-05-09 VITALS
TEMPERATURE: 98.3 F | HEART RATE: 73 BPM | RESPIRATION RATE: 17 BRPM | OXYGEN SATURATION: 98 % | DIASTOLIC BLOOD PRESSURE: 72 MMHG | SYSTOLIC BLOOD PRESSURE: 120 MMHG

## 2019-05-09 DIAGNOSIS — N39.0 URINARY TRACT INFECTION ASSOCIATED WITH INDWELLING URETHRAL CATHETER, INITIAL ENCOUNTER (HCC): ICD-10-CM

## 2019-05-09 DIAGNOSIS — R33.9 URINARY RETENTION: ICD-10-CM

## 2019-05-09 DIAGNOSIS — T83.9XXA PROBLEM WITH FOLEY CATHETER, INITIAL ENCOUNTER (HCC): ICD-10-CM

## 2019-05-09 DIAGNOSIS — T83.511A URINARY TRACT INFECTION ASSOCIATED WITH INDWELLING URETHRAL CATHETER, INITIAL ENCOUNTER (HCC): ICD-10-CM

## 2019-05-09 DIAGNOSIS — S37.30XA URETHRAL INJURY, CLOSED, INITIAL ENCOUNTER: ICD-10-CM

## 2019-05-09 DIAGNOSIS — F03.90 DEMENTIA WITHOUT BEHAVIORAL DISTURBANCE, UNSPECIFIED DEMENTIA TYPE: ICD-10-CM

## 2019-05-09 LAB
ALBUMIN SERPL BCP-MCNC: 3.3 G/DL (ref 3.2–4.9)
ALBUMIN/GLOB SERPL: 1.3 G/DL
ALP SERPL-CCNC: 65 U/L (ref 30–99)
ALT SERPL-CCNC: 9 U/L (ref 2–50)
ANION GAP SERPL CALC-SCNC: 9 MMOL/L (ref 0–11.9)
APPEARANCE UR: ABNORMAL
AST SERPL-CCNC: 12 U/L (ref 12–45)
BACTERIA #/AREA URNS HPF: ABNORMAL /HPF
BASOPHILS # BLD AUTO: 0.3 % (ref 0–1.8)
BASOPHILS # BLD: 0.04 K/UL (ref 0–0.12)
BILIRUB SERPL-MCNC: 0.5 MG/DL (ref 0.1–1.5)
BILIRUB UR QL STRIP.AUTO: NEGATIVE
BUN SERPL-MCNC: 22 MG/DL (ref 8–22)
CALCIUM SERPL-MCNC: 8.6 MG/DL (ref 8.5–10.5)
CHLORIDE SERPL-SCNC: 104 MMOL/L (ref 96–112)
CO2 SERPL-SCNC: 27 MMOL/L (ref 20–33)
COLOR UR: ABNORMAL
CREAT SERPL-MCNC: 1.09 MG/DL (ref 0.5–1.4)
EOSINOPHIL # BLD AUTO: 0.06 K/UL (ref 0–0.51)
EOSINOPHIL NFR BLD: 0.5 % (ref 0–6.9)
EPI CELLS #/AREA URNS HPF: ABNORMAL /HPF
ERYTHROCYTE [DISTWIDTH] IN BLOOD BY AUTOMATED COUNT: 45.3 FL (ref 35.9–50)
GLOBULIN SER CALC-MCNC: 2.5 G/DL (ref 1.9–3.5)
GLUCOSE SERPL-MCNC: 119 MG/DL (ref 65–99)
GLUCOSE UR STRIP.AUTO-MCNC: NEGATIVE MG/DL
HCT VFR BLD AUTO: 45.6 % (ref 42–52)
HGB BLD-MCNC: 14.8 G/DL (ref 14–18)
IMM GRANULOCYTES # BLD AUTO: 0.08 K/UL (ref 0–0.11)
IMM GRANULOCYTES NFR BLD AUTO: 0.7 % (ref 0–0.9)
KETONES UR STRIP.AUTO-MCNC: NEGATIVE MG/DL
LEUKOCYTE ESTERASE UR QL STRIP.AUTO: ABNORMAL
LYMPHOCYTES # BLD AUTO: 0.72 K/UL (ref 1–4.8)
LYMPHOCYTES NFR BLD: 6.2 % (ref 22–41)
MCH RBC QN AUTO: 31.3 PG (ref 27–33)
MCHC RBC AUTO-ENTMCNC: 32.5 G/DL (ref 33.7–35.3)
MCV RBC AUTO: 96.4 FL (ref 81.4–97.8)
MICRO URNS: ABNORMAL
MONOCYTES # BLD AUTO: 0.77 K/UL (ref 0–0.85)
MONOCYTES NFR BLD AUTO: 6.7 % (ref 0–13.4)
NEUTROPHILS # BLD AUTO: 9.87 K/UL (ref 1.82–7.42)
NEUTROPHILS NFR BLD: 85.6 % (ref 44–72)
NITRITE UR QL STRIP.AUTO: POSITIVE
NRBC # BLD AUTO: 0 K/UL
NRBC BLD-RTO: 0 /100 WBC
PH UR STRIP.AUTO: 8.5 [PH]
PLATELET # BLD AUTO: 232 K/UL (ref 164–446)
PMV BLD AUTO: 9.3 FL (ref 9–12.9)
POTASSIUM SERPL-SCNC: 4.4 MMOL/L (ref 3.6–5.5)
PROT SERPL-MCNC: 5.8 G/DL (ref 6–8.2)
PROT UR QL STRIP: 100 MG/DL
RBC # BLD AUTO: 4.73 M/UL (ref 4.7–6.1)
RBC # URNS HPF: >150 /HPF
RBC UR QL AUTO: ABNORMAL
SODIUM SERPL-SCNC: 140 MMOL/L (ref 135–145)
SP GR UR STRIP.AUTO: 1.01
UROBILINOGEN UR STRIP.AUTO-MCNC: 0.2 MG/DL
WBC # BLD AUTO: 11.5 K/UL (ref 4.8–10.8)
WBC #/AREA URNS HPF: ABNORMAL /HPF

## 2019-05-09 PROCEDURE — 700105 HCHG RX REV CODE 258: Performed by: EMERGENCY MEDICINE

## 2019-05-09 PROCEDURE — 303105 HCHG CATHETER EXTRA

## 2019-05-09 PROCEDURE — 700111 HCHG RX REV CODE 636 W/ 250 OVERRIDE (IP): Performed by: EMERGENCY MEDICINE

## 2019-05-09 PROCEDURE — 99285 EMERGENCY DEPT VISIT HI MDM: CPT

## 2019-05-09 PROCEDURE — 665998 HH PPS REVENUE CREDIT

## 2019-05-09 PROCEDURE — 81001 URINALYSIS AUTO W/SCOPE: CPT

## 2019-05-09 PROCEDURE — 85025 COMPLETE CBC W/AUTO DIFF WBC: CPT

## 2019-05-09 PROCEDURE — 665999 HH PPS REVENUE DEBIT

## 2019-05-09 PROCEDURE — 36415 COLL VENOUS BLD VENIPUNCTURE: CPT

## 2019-05-09 PROCEDURE — 87077 CULTURE AEROBIC IDENTIFY: CPT

## 2019-05-09 PROCEDURE — 87086 URINE CULTURE/COLONY COUNT: CPT

## 2019-05-09 PROCEDURE — 51702 INSERT TEMP BLADDER CATH: CPT

## 2019-05-09 PROCEDURE — 80053 COMPREHEN METABOLIC PANEL: CPT

## 2019-05-09 PROCEDURE — 51798 US URINE CAPACITY MEASURE: CPT

## 2019-05-09 PROCEDURE — 96365 THER/PROPH/DIAG IV INF INIT: CPT

## 2019-05-09 PROCEDURE — 87186 SC STD MICRODIL/AGAR DIL: CPT

## 2019-05-09 RX ORDER — CIPROFLOXACIN 500 MG/1
500 TABLET, FILM COATED ORAL 2 TIMES DAILY
Qty: 20 TAB | Refills: 0 | Status: SHIPPED | OUTPATIENT
Start: 2019-05-09 | End: 2019-05-19

## 2019-05-09 RX ORDER — SODIUM CHLORIDE 9 MG/ML
1000 INJECTION, SOLUTION INTRAVENOUS ONCE
Status: COMPLETED | OUTPATIENT
Start: 2019-05-09 | End: 2019-05-09

## 2019-05-09 RX ADMIN — CEFTRIAXONE SODIUM 1 G: 1 INJECTION, POWDER, FOR SOLUTION INTRAMUSCULAR; INTRAVENOUS at 17:43

## 2019-05-09 RX ADMIN — SODIUM CHLORIDE 1000 ML: 9 INJECTION, SOLUTION INTRAVENOUS at 14:14

## 2019-05-09 ASSESSMENT — PATIENT HEALTH QUESTIONNAIRE - PHQ9
CLINICAL INTERPRETATION OF PHQ2 SCORE: 0
CLINICAL INTERPRETATION OF PHQ2 SCORE: 0

## 2019-05-09 ASSESSMENT — ENCOUNTER SYMPTOMS: DIFFICULTY THINKING: 1

## 2019-05-09 ASSESSMENT — ACTIVITIES OF DAILY LIVING (ADL)
HOME_HEALTH_OASIS: 01
HOME_HEALTH_OASIS: 01
OASIS_M1830: 03
OASIS_M1830: 03

## 2019-05-09 NOTE — ED NOTES
Called traction for bladder scan.  IV access obtained.  Blood drawn and sent to lab.    IVF infusing.

## 2019-05-09 NOTE — ED PROVIDER NOTES
"ED Provider Note    CHIEF COMPLAINT  Chief Complaint   Patient presents with   • Urinary Catheter Problem       HPI  Andre Troncoso is a 78 y.o. male who presents for evaluation of urinary catheter problem.  The patient was admitted within the last month for possible fall or syncope.  The patient was bradycardic at that time.  Patient does have a history of advanced dementia is a poor historian.  Patient was having problems with urinary retention a Holt catheter placed.  The patient was transferred here from the nursing home as patient's not had any urine output in the last 24 hours.  The patient has dementia.  He has no acute complaints.  He denies: Head pain, chest pain, shortness breath, abdominal pain.  No other acute symptomatology or complaints.    REVIEW OF SYSTEMS  See HPI for further details.  Somewhat unreliable due to his dementia.  All other systems negative.    PAST MEDICAL HISTORY  Past Medical History:   Diagnosis Date   • Essential hypertension 4/9/2019   • Macular degeneration    • Psychiatric disorder     Dementia   • UTI (urinary tract infection)        FAMILY HISTORY  No family history on file.    SOCIAL HISTORY  Positive tobacco use; denies alcohol or drug;    SURGICAL HISTORY  Past Surgical History:   Procedure Laterality Date   • OTHER      Prostate Surgery       CURRENT MEDICATIONS  See nurse's notes    ALLERGIES  No Known Allergies    PHYSICAL EXAM  VITAL SIGNS: /70   Pulse 75   Temp 37.3 °C (99.1 °F) (Temporal)   Resp 19   Ht 1.753 m (5' 9\")   Wt 60.8 kg (134 lb)   SpO2 97%   BMI 19.79 kg/m²    Constitutional: 78-year-old male, awake, pleasant, oriented x1  HENT: ,Atraumatic, Bilateral external ears normal, tympanic membranes clear, Oropharynx dry, No oral exudates, Nose normal.   Eyes: PERRL, EOMI, Conjunctiva normal, No discharge.   Neck: Normal range of motion, No tenderness, Supple, No stridor.   Lymphatic: No lymphadenopathy noted.   Cardiovascular: Normal heart " rate, Normal rhythm, No murmurs, No rubs, No gallops.   Thorax & Lungs: Normal Equal breath sounds, No respiratory distress, No wheezing, no stridor, no rales. No chest tenderness.   Abdomen: Soft, nontender, nondistended, no organomegaly, positive bowel sounds normal in quality. No guarding or rebound.  Skin: Decreased skin turgor, pink, warm, dry. No rashes, petechiae, purpura. Normal capillary refill.   Back: No tenderness, No CVA tenderness.   Genitalia: Holt catheter is in place; no urine output is identified in the leg bag; external genitalia appear normal, No masses or lesions. No discharge. Nontender  Extremities: Intact distal pulses, No edema, No tenderness, No cyanosis, No clubbing. Vascular: Pulses are 2+, symmetric in the upper and lower extremities.  Musculoskeletal: Good range of motion in all major joints. No tenderness to palpation or major deformities noted.   Neurologic: Alert & oriented x 1, Normal motor function, Normal sensory function, No gross focal deficits noted.     COURSE & MEDICAL DECISION MAKING  Pertinent Labs & Imaging studies reviewed. (See chart for details)  1.  IV normal saline; IV fluids administered for clinical dehydration; unable to attempt oral challenge as patient requires n.p.o. status pending possible surgical intervention; reevaluation revealed stable status;  2.  Ceftriaxone 2 g IV    Laboratory studies: CBC shows white count of 11.5, 85% neutrophils, 6% lymphocytes, 6% monocytes, hemoglobin 14.8 hematocrit 45.6; CMP shows glucose 119 otherwise within normal; urinalysis is positive for nitrite and leukocyte esterase, WBCs 20-50, RBCs greater than 150, bacteria moderate, epithelial cells rare;    Discussion: The patient presents here with decreased urine output.  A bladder scan was obtained which showed over 400 cc of urine in the bladder.  I requested nursing personnel the change of Holt catheter.  They indicated to me that the patient's catheter was just barely in his  urethra and the balloon was still inflated.  After they removed that he had bleeding from the urethra.  We re-inserted the Holt catheter without any difficulty.  Patient is having some bloody urine.  The bladder was irrigated until clear.  The patient had clear urine output.  Patient has associated urinary tract infection.  Patient appears to have had partial dislodging of the Holt catheter resulting in probable urethral injury.  The catheter will remain in place after it was replaced today.  Patient was treated with IV antibiotics and will be placed on oral antibiotics.  Patient is to follow-up with urology for further evaluation.    FINAL IMPRESSION  1. Problem with Holt catheter, initial encounter ([facility])    2. Urethral injury, closed, initial encounter    3. Urinary retention    4. Urinary tract infection associated with indwelling urethral catheter, initial encounter ([facility])    5. Dementia without behavioral disturbance, unspecified dementia type           PLAN  1.  Appropriate discharge instructions given  2.  Cipro 500 mg twice daily #20  3.  Follow-up with urology on call  4.  Follow-up with primary care    Electronically signed by: Guy G Gansert, 5/9/2019 1:34 PM

## 2019-05-09 NOTE — ED NOTES
Pt BIB EMS from [facility].  Mentation at baseline.  Per reports pt hasn't had any urine output from catheter for the last 2 days, but reports of blood in catheter bag.  Pt daughter - Clara Irvin informed of transfer to ER.  Pt denies complaints.  Pt into gown and placed on monitors.  ERP to see.

## 2019-05-09 NOTE — ED NOTES
Pt Bladder scan 460, ERP informed and old catheter removed, old catheter approx one inch into penis with balloon inflated.  Pt bleeding out of penis.  ERP to bedside to assess.  New catheter placed, initial drainage bloody than started clearing but after approx 400 cc out bloody drainage started again.  ERP informed.  Urine sample collected and sent to lab.

## 2019-05-10 PROCEDURE — 665998 HH PPS REVENUE CREDIT

## 2019-05-10 PROCEDURE — 665999 HH PPS REVENUE DEBIT

## 2019-05-10 NOTE — ED NOTES
informed that pt is going to be discharged and needs transportation back to [facility].

## 2019-05-10 NOTE — DISCHARGE PLANNING
SHIRA notified that Pt is in need of transportation back to the [facility].     SW notified Pt daughter Clara [phone]. She is unable to provide transportation.     PCS completed and taxed to [facility].  Transportation time arranged for 1845    ED Encounter printed and placed in envelope for [facility]. RN updated on transfer and transfer time.

## 2019-05-10 NOTE — ED NOTES
Matt at bedside for transfer of pt back to [facility].    All belongings with pt on transfer.

## 2019-05-10 NOTE — DISCHARGE INSTRUCTIONS
"Catheter-Associated Urinary Tract Infection FAQs  What is \"catheter-associated urinary tract infection\"?   A urinary tract infection (also called “UTI”) is an infection in the urinary system, which includes the bladder (which stores the urine) and the kidneys (which filter the blood to make urine). Germs (for example, bacteria or yeasts) do not normally live in these areas; but if germs are introduced, an infection can occur.  If you have a urinary catheter, germs can travel along the catheter and cause an infection in your bladder or your kidney; in that case it is called a catheter-associated urinary tract infection (or “CA-UTI”).  What is a urinary catheter?   A urinary catheter is a thin tube placed in the bladder to drain urine. Urine drains through the tube into a bag that collects the urine. A urinary catheter may be used:  · If you are not able to urinate on your own  · To measure the amount of urine that you make, for example, during intensive care  · During and after some types of surgery  · During some tests of the kidneys and bladder  People with urinary catheters have a much higher chance of getting a urinary tract infection than people who don’t have a catheter.  How do I get a catheter-associated urinary tract infection (CA-UTI)?   If germs enter the urinary tract, they may cause an infection. Many of the germs that cause a catheter-associated urinary tract infection are common germs found in your intestines that do not usually cause an infection there. Germs can enter the urinary tract when the catheter is being put in or while the catheter remains in the bladder.  What are the symptoms of a urinary tract infection?   Some of the common symptoms of a urinary tract infection are:  · Burning or pain in the lower abdomen (that is, below the stomach)  · Fever  · Bloody urine may be a sign of infection, but is also caused by other problems  · Burning during urination or an increase in the frequency of " urination after the catheter is removed.  Sometimes people with catheter-associated urinary tract infections do not have these symptoms of infection.  Can catheter-associated urinary tract infections be treated?   Yes, most catheter-associated urinary tract infections can be treated with antibiotics and removal or change of the catheter. Your doctor will determine which antibiotic is best for you.  What are some of the things that hospitals are doing to prevent catheter-associated urinary tract infections?   To prevent urinary tract infections, doctors and nurses take the following actions.  Catheter insertion  · Catheters are put in only when necessary and they are removed as soon as possible.  · Only properly trained persons insert catheters using sterile (“clean”) technique.  · The skin in the area where the catheter will be inserted is cleaned before inserting the catheter.  · Other methods to drain the urine are sometimes used, such as:  ¨ External catheters in men (these look like condoms and are placed over the penis rather than into the penis)  ¨ Putting a temporary catheter in to drain the urine and removing it right away. This is called intermittent urethral catheterization.  Catheter care  · Healthcare providers clean their hands by washing them with soap and water or using an alcohol-based hand rub before and after touching your catheter.  · If you do not see your providers clean their hands, please ask them to do so.  · Avoid disconnecting the catheter and drain tube. This helps to prevent germs from getting into the catheter tube.  · The catheter is secured to the leg to prevent pulling on the catheter.  · Avoid twisting or kinking the catheter.  · Keep the bag lower than the bladder to prevent urine from backflowing to the bladder.  · Empty the bag regularly. The drainage spout should not touch anything while emptying the bag.  What can I do to help prevent catheter-associated urinary tract infections  if I have a catheter?  · Always clean your hands before and after doing catheter care.  · Always keep your urine bag below the level of your bladder.  · Do not tug or pull on the tubing.  · Do not twist or kink the catheter tubing.  · Ask your healthcare provider each day if you still need the catheter.  What do I need to do when I go home from the hospital?  · If you will be going home with a catheter, your doctor or nurse should explain everything you need to know about taking care of the catheter. Make sure you understand how to care for it before you leave the hospital.  · If you develop any of the symptoms of a urinary tract infection, such as burning or pain in the lower abdomen, fever, or an increase in the frequency of urination, contact your doctor or nurse immediately.  · Before you go home, make sure you know who to contact if you have questions or problems after you get home.  If you have questions, please ask your doctor or nurse.   Developed and co-sponsored by The Society for Healthcare Epidemiology of [place] (SHEA); Infectious Diseases Society of [place] (IDSA); American Hospital Association; Association for Professionals in Infection Control and Epidemiology (APIC); Centers for Disease Control and Prevention (CDC); and The Joint Commission.   This information is not intended to replace advice given to you by your health care provider. Make sure you discuss any questions you have with your health care provider.  Document Released: 09/11/2013 Document Revised: 05/31/2017 Document Reviewed: 03/02/2016  [employer] Patient Education © 2017 [employer]

## 2019-05-10 NOTE — ED NOTES
Urine starting to clear more, blakely flushed, no clots returned.  Warm blankets provided.  No needs noted.

## 2019-05-11 LAB
BACTERIA UR CULT: ABNORMAL
BACTERIA UR CULT: ABNORMAL
SIGNIFICANT IND 70042: ABNORMAL
SITE SITE: ABNORMAL
SOURCE SOURCE: ABNORMAL

## 2019-05-11 PROCEDURE — 665999 HH PPS REVENUE DEBIT

## 2019-05-11 PROCEDURE — 665998 HH PPS REVENUE CREDIT

## 2019-05-12 PROCEDURE — 665999 HH PPS REVENUE DEBIT

## 2019-05-12 PROCEDURE — 665998 HH PPS REVENUE CREDIT

## 2019-05-12 NOTE — ED NOTES
ED Positive Culture Follow-up/Notification Note:    Date: 5/12/19     Patient seen in the ED on 5/9/2019 for catheter related issues.   1. Problem with Holt catheter, initial encounter ([facility])    2. Urethral injury, closed, initial encounter    3. Urinary retention    4. Urinary tract infection associated with indwelling urethral catheter, initial encounter ([facility])    5. Dementia without behavioral disturbance, unspecified dementia type       Discharge Medication List as of 5/9/2019  6:37 PM      START taking these medications    Details   ciprofloxacin (CIPRO) 500 MG Tab Take 1 Tab by mouth 2 times a day for 10 days., Disp-20 Tab, R-0, Print Rx Paper             Allergies: Patient has no known allergies.     Vitals:    05/09/19 1630 05/09/19 1701 05/09/19 1730 05/09/19 1800   BP:    132/75   Pulse: 86 95 100 99   Resp: 16 16 16 16   Temp:       TempSrc:       SpO2: 97% 94% 94% 93%   Weight:       Height:           Final cultures:   Results     Procedure Component Value Units Date/Time    URINE CULTURE(NEW) [[id number]]  (Abnormal)  (Susceptibility) Collected:  05/09/19 1450    Order Status:  Completed Specimen:  Urine Updated:  05/11/19 0849     Significant Indicator POS (POS)     Source UR     Site -     Culture Result - (A)      Morganella morganii  >100,000 cfu/mL   (A)    Culture & Susceptibility     MORGANELLA MORGANII     Antibiotic Sensitivity Microscan Unit Status    Ampicillin Resistant >16 mcg/mL Final    Method: ABY    Cefepime Sensitive <=8 mcg/mL Final    Method: ABY    Cefotaxime Sensitive <=2 mcg/mL Final    Method: ABY    Cefotetan Sensitive <=16 mcg/mL Final    Method: ABY    Ceftazidime Sensitive 4 mcg/mL Final    Method: ABY    Ceftriaxone Sensitive <=8 mcg/mL Final    Method: ABY    Cefuroxime Resistant >16 mcg/mL Final    Method: ABY    Cephalothin Resistant >16 mcg/mL Final    Method: ABY    Ciprofloxacin Sensitive <=1 mcg/mL Final    Method: ABY    Gentamicin Sensitive <=4 mcg/mL Final    Method:  ABY    Levofloxacin Sensitive <=2 mcg/mL Final    Method: ABY    Nitrofurantoin Resistant >64 mcg/mL Final    Method: ABY    Pip/Tazobactam Sensitive <=16 mcg/mL Final    Method: ABY    Piperacillin Sensitive <=16 mcg/mL Final    Method: ABY    Tigecycline Sensitive <=2 mcg/mL Final    Method: ABY    Tobramycin Sensitive <=4 mcg/mL Final    Method: ABY    Trimeth/Sulfa Sensitive <=2/38 mcg/mL Final    Method: ABY                       URINALYSIS CULTURE, IF INDICATED [[id number]]  (Abnormal) Collected:  05/09/19 1450    Order Status:  Completed Specimen:  Urine from Urine, Holt Cath Updated:  05/09/19 1525     Color Dark Yellow     Character Cloudy (A)     Specific Gravity 1.010     Ph 8.5 (A)     Glucose Negative mg/dL      Ketones Negative mg/dL      Protein 100 (A) mg/dL      Bilirubin Negative     Urobilinogen, Urine 0.2     Nitrite Positive (A)     Leukocyte Esterase Moderate (A)     Occult Blood Large (A)     Micro Urine Req Microscopic          Plan:   Unable to reach patient or POA by phone.   Likely could cut duration of therapy to 5-7 days with antibiotic selection for complicated UTI.  No address listed for patient at [facility], unable to send letter.     Tay Méndez

## 2019-05-13 PROCEDURE — 665999 HH PPS REVENUE DEBIT

## 2019-05-13 PROCEDURE — 665998 HH PPS REVENUE CREDIT

## 2019-05-14 PROCEDURE — 665998 HH PPS REVENUE CREDIT

## 2019-05-14 PROCEDURE — 665999 HH PPS REVENUE DEBIT

## 2019-05-15 PROCEDURE — 665998 HH PPS REVENUE CREDIT

## 2019-05-15 PROCEDURE — 665999 HH PPS REVENUE DEBIT

## 2019-05-16 PROCEDURE — 665998 HH PPS REVENUE CREDIT

## 2019-05-16 PROCEDURE — 665999 HH PPS REVENUE DEBIT

## 2019-05-17 PROCEDURE — 665999 HH PPS REVENUE DEBIT

## 2019-05-17 PROCEDURE — 665998 HH PPS REVENUE CREDIT

## 2019-05-18 PROCEDURE — 665999 HH PPS REVENUE DEBIT

## 2019-05-18 PROCEDURE — 665998 HH PPS REVENUE CREDIT

## 2019-05-19 PROCEDURE — 665999 HH PPS REVENUE DEBIT

## 2019-05-19 PROCEDURE — 665998 HH PPS REVENUE CREDIT

## 2019-10-30 ENCOUNTER — HOSPITAL ENCOUNTER (OUTPATIENT)
Facility: MEDICAL CENTER | Age: 79
End: 2019-10-30
Attending: FAMILY MEDICINE
Payer: MEDICARE

## 2019-10-30 PROCEDURE — 81001 URINALYSIS AUTO W/SCOPE: CPT

## 2019-10-30 PROCEDURE — 87086 URINE CULTURE/COLONY COUNT: CPT

## 2019-10-31 LAB
APPEARANCE UR: ABNORMAL
BACTERIA #/AREA URNS HPF: NEGATIVE /HPF
BILIRUB UR QL STRIP.AUTO: NEGATIVE
COLOR UR: ABNORMAL
EPI CELLS #/AREA URNS HPF: NEGATIVE /HPF
GLUCOSE UR STRIP.AUTO-MCNC: NEGATIVE MG/DL
HYALINE CASTS #/AREA URNS LPF: ABNORMAL /LPF
KETONES UR STRIP.AUTO-MCNC: NEGATIVE MG/DL
LEUKOCYTE ESTERASE UR QL STRIP.AUTO: ABNORMAL
MICRO URNS: ABNORMAL
NITRITE UR QL STRIP.AUTO: NEGATIVE
PH UR STRIP.AUTO: 5.5 [PH] (ref 5–8)
PROT UR QL STRIP: 30 MG/DL
RBC # URNS HPF: ABNORMAL /HPF
RBC UR QL AUTO: ABNORMAL
SP GR UR STRIP.AUTO: 1.02
UROBILINOGEN UR STRIP.AUTO-MCNC: 0.2 MG/DL
WBC #/AREA URNS HPF: ABNORMAL /HPF

## 2019-11-02 LAB
BACTERIA UR CULT: NORMAL
SIGNIFICANT IND 70042: NORMAL
SITE SITE: NORMAL
SOURCE SOURCE: NORMAL

## 2020-02-14 ENCOUNTER — HOSPITAL ENCOUNTER (OUTPATIENT)
Facility: MEDICAL CENTER | Age: 80
End: 2020-02-14
Attending: FAMILY MEDICINE
Payer: MEDICARE

## 2020-02-14 LAB
APPEARANCE UR: CLEAR
BACTERIA #/AREA URNS HPF: ABNORMAL /HPF
BILIRUB UR QL STRIP.AUTO: NEGATIVE
COLOR UR: YELLOW
EPI CELLS #/AREA URNS HPF: NEGATIVE /HPF
GLUCOSE UR STRIP.AUTO-MCNC: NEGATIVE MG/DL
HYALINE CASTS #/AREA URNS LPF: ABNORMAL /LPF
KETONES UR STRIP.AUTO-MCNC: NEGATIVE MG/DL
LEUKOCYTE ESTERASE UR QL STRIP.AUTO: ABNORMAL
MICRO URNS: ABNORMAL
NITRITE UR QL STRIP.AUTO: NEGATIVE
PH UR STRIP.AUTO: >=9 [PH] (ref 5–8)
PROT UR QL SSA: NEGATIVE MG/DL
PROT UR QL STRIP: 100 MG/DL
RBC # URNS HPF: ABNORMAL /HPF
RBC UR QL AUTO: NEGATIVE
SP GR UR STRIP.AUTO: 1.03
UROBILINOGEN UR STRIP.AUTO-MCNC: 0.2 MG/DL
WBC #/AREA URNS HPF: ABNORMAL /HPF

## 2020-02-14 PROCEDURE — 87086 URINE CULTURE/COLONY COUNT: CPT

## 2020-02-14 PROCEDURE — 87077 CULTURE AEROBIC IDENTIFY: CPT

## 2020-02-14 PROCEDURE — 81001 URINALYSIS AUTO W/SCOPE: CPT

## 2020-02-14 PROCEDURE — 87186 SC STD MICRODIL/AGAR DIL: CPT

## 2020-11-26 NOTE — PROGRESS NOTES
Per Dr. Shahid, pt to be discharged out with blakely catheter.    no swelling/capillary refill time < 2 seconds/fingers/toes warm to touch/no paresthesia/no cyanosis of extremity Principal Discharge DX:	Viral illness